# Patient Record
Sex: FEMALE | Race: WHITE | NOT HISPANIC OR LATINO | URBAN - NONMETROPOLITAN AREA
[De-identification: names, ages, dates, MRNs, and addresses within clinical notes are randomized per-mention and may not be internally consistent; named-entity substitution may affect disease eponyms.]

---

## 2023-05-22 ENCOUNTER — TELEPHONE (OUTPATIENT)
Dept: PRIMARY CARE | Facility: CLINIC | Age: 68
End: 2023-05-22
Payer: MEDICARE

## 2023-05-22 DIAGNOSIS — K21.9 GASTROESOPHAGEAL REFLUX DISEASE WITHOUT ESOPHAGITIS: Primary | ICD-10-CM

## 2023-05-22 RX ORDER — OMEPRAZOLE 20 MG/1
20 CAPSULE, DELAYED RELEASE ORAL DAILY
Qty: 90 CAPSULE | Refills: 1 | Status: SHIPPED | OUTPATIENT
Start: 2023-05-22 | End: 2024-02-09 | Stop reason: SDUPTHER

## 2023-05-22 RX ORDER — OMEPRAZOLE 20 MG/1
20 CAPSULE, DELAYED RELEASE ORAL DAILY
COMMUNITY
End: 2023-05-22 | Stop reason: SDUPTHER

## 2023-08-09 ENCOUNTER — OFFICE VISIT (OUTPATIENT)
Dept: PRIMARY CARE | Facility: CLINIC | Age: 68
End: 2023-08-09
Payer: MEDICARE

## 2023-08-09 VITALS
BODY MASS INDEX: 29.55 KG/M2 | DIASTOLIC BLOOD PRESSURE: 64 MMHG | WEIGHT: 166.8 LBS | HEIGHT: 63 IN | HEART RATE: 56 BPM | OXYGEN SATURATION: 98 % | SYSTOLIC BLOOD PRESSURE: 114 MMHG | TEMPERATURE: 97.2 F

## 2023-08-09 DIAGNOSIS — Z78.0 ASYMPTOMATIC POSTMENOPAUSAL STATE: ICD-10-CM

## 2023-08-09 DIAGNOSIS — Z00.00 MEDICARE ANNUAL WELLNESS VISIT, SUBSEQUENT: Primary | ICD-10-CM

## 2023-08-09 DIAGNOSIS — Z12.31 SCREENING MAMMOGRAM FOR BREAST CANCER: ICD-10-CM

## 2023-08-09 DIAGNOSIS — I48.0 PAROXYSMAL ATRIAL FIBRILLATION (MULTI): ICD-10-CM

## 2023-08-09 DIAGNOSIS — I47.10 PAROXYSMAL SVT (SUPRAVENTRICULAR TACHYCARDIA) (CMS-HCC): ICD-10-CM

## 2023-08-09 DIAGNOSIS — E78.5 HYPERLIPIDEMIA, UNSPECIFIED HYPERLIPIDEMIA TYPE: ICD-10-CM

## 2023-08-09 PROBLEM — M16.9 OSTEOARTHRITIS OF HIP: Status: RESOLVED | Noted: 2023-08-09 | Resolved: 2023-08-09

## 2023-08-09 PROBLEM — H01.006 BLEPHARITIS, BOTH EYES: Status: RESOLVED | Noted: 2023-08-09 | Resolved: 2023-08-09

## 2023-08-09 PROBLEM — J30.9 ALLERGIC RHINITIS: Status: ACTIVE | Noted: 2023-08-09

## 2023-08-09 PROBLEM — H17.9 BILATERAL CORNEAL SCARS: Status: RESOLVED | Noted: 2023-08-09 | Resolved: 2023-08-09

## 2023-08-09 PROBLEM — S05.8X9A: Status: RESOLVED | Noted: 2023-08-09 | Resolved: 2023-08-09

## 2023-08-09 PROBLEM — R42 VERTIGO: Status: RESOLVED | Noted: 2023-08-09 | Resolved: 2023-08-09

## 2023-08-09 PROBLEM — Z98.890 STATUS POST LASIK SURGERY: Status: RESOLVED | Noted: 2023-08-09 | Resolved: 2023-08-09

## 2023-08-09 PROBLEM — M77.01 MEDIAL EPICONDYLITIS OF RIGHT ELBOW: Status: RESOLVED | Noted: 2023-08-09 | Resolved: 2023-08-09

## 2023-08-09 PROBLEM — M54.16 LUMBAR RADICULOPATHY: Status: ACTIVE | Noted: 2023-08-09

## 2023-08-09 PROBLEM — R92.8 ABNORMAL SCREENING MAMMOGRAM: Status: RESOLVED | Noted: 2023-08-09 | Resolved: 2023-08-09

## 2023-08-09 PROBLEM — H25.12 AGE-RELATED NUCLEAR CATARACT OF LEFT EYE: Status: RESOLVED | Noted: 2023-08-09 | Resolved: 2023-08-09

## 2023-08-09 PROBLEM — H01.003 BLEPHARITIS, BOTH EYES: Status: RESOLVED | Noted: 2023-08-09 | Resolved: 2023-08-09

## 2023-08-09 PROBLEM — H25.13 NUCLEAR SCLEROTIC CATARACT OF BOTH EYES: Status: RESOLVED | Noted: 2023-08-09 | Resolved: 2023-08-09

## 2023-08-09 PROBLEM — K21.9 ESOPHAGEAL REFLUX: Status: ACTIVE | Noted: 2023-08-09

## 2023-08-09 PROBLEM — F41.0 PANIC ATTACKS: Status: ACTIVE | Noted: 2023-08-09

## 2023-08-09 PROBLEM — H31.001 CHORIORETINAL SCAR OF RIGHT EYE: Status: RESOLVED | Noted: 2023-08-09 | Resolved: 2023-08-09

## 2023-08-09 PROBLEM — K76.9 LIVER LESION: Status: RESOLVED | Noted: 2023-08-09 | Resolved: 2023-08-09

## 2023-08-09 PROBLEM — I10 HYPERTENSION: Status: ACTIVE | Noted: 2023-08-09

## 2023-08-09 PROBLEM — F41.9 ANXIETY: Status: ACTIVE | Noted: 2023-08-09

## 2023-08-09 PROBLEM — H02.59 FLOPPY LID SYNDROME: Status: RESOLVED | Noted: 2023-08-09 | Resolved: 2023-08-09

## 2023-08-09 PROBLEM — M72.2 PLANTAR FASCIITIS OF LEFT FOOT: Status: RESOLVED | Noted: 2023-08-09 | Resolved: 2023-08-09

## 2023-08-09 PROBLEM — H04.123 DRY EYES: Status: ACTIVE | Noted: 2023-08-09

## 2023-08-09 PROCEDURE — G0439 PPPS, SUBSEQ VISIT: HCPCS | Performed by: FAMILY MEDICINE

## 2023-08-09 PROCEDURE — 1159F MED LIST DOCD IN RCRD: CPT | Performed by: FAMILY MEDICINE

## 2023-08-09 PROCEDURE — 3078F DIAST BP <80 MM HG: CPT | Performed by: FAMILY MEDICINE

## 2023-08-09 PROCEDURE — 3008F BODY MASS INDEX DOCD: CPT | Performed by: FAMILY MEDICINE

## 2023-08-09 PROCEDURE — 1160F RVW MEDS BY RX/DR IN RCRD: CPT | Performed by: FAMILY MEDICINE

## 2023-08-09 PROCEDURE — 1170F FXNL STATUS ASSESSED: CPT | Performed by: FAMILY MEDICINE

## 2023-08-09 PROCEDURE — 3074F SYST BP LT 130 MM HG: CPT | Performed by: FAMILY MEDICINE

## 2023-08-09 PROCEDURE — 1036F TOBACCO NON-USER: CPT | Performed by: FAMILY MEDICINE

## 2023-08-09 RX ORDER — FLECAINIDE ACETATE 100 MG/1
1 TABLET ORAL 2 TIMES DAILY
COMMUNITY
Start: 2022-06-27 | End: 2023-08-09 | Stop reason: ALTCHOICE

## 2023-08-09 RX ORDER — CYCLOSPORINE 0.5 MG/ML
EMULSION OPHTHALMIC
COMMUNITY

## 2023-08-09 RX ORDER — MULTIVITAMIN
TABLET ORAL
COMMUNITY

## 2023-08-09 RX ORDER — RIVAROXABAN 20 MG/1
20 TABLET, FILM COATED ORAL NIGHTLY
COMMUNITY
End: 2024-04-10 | Stop reason: SDUPTHER

## 2023-08-09 RX ORDER — METOPROLOL TARTRATE 100 MG/1
100 TABLET ORAL 2 TIMES DAILY
COMMUNITY
End: 2023-12-27 | Stop reason: SDUPTHER

## 2023-08-09 ASSESSMENT — ENCOUNTER SYMPTOMS
DEPRESSION: 0
LOSS OF SENSATION IN FEET: 0
OCCASIONAL FEELINGS OF UNSTEADINESS: 0

## 2023-08-09 ASSESSMENT — ACTIVITIES OF DAILY LIVING (ADL)
TAKING_MEDICATION: INDEPENDENT
DOING_HOUSEWORK: INDEPENDENT
BATHING: INDEPENDENT
MANAGING_FINANCES: INDEPENDENT
DRESSING: INDEPENDENT
GROCERY_SHOPPING: INDEPENDENT

## 2023-08-09 ASSESSMENT — PATIENT HEALTH QUESTIONNAIRE - PHQ9
2. FEELING DOWN, DEPRESSED OR HOPELESS: NOT AT ALL
1. LITTLE INTEREST OR PLEASURE IN DOING THINGS: NOT AT ALL
SUM OF ALL RESPONSES TO PHQ9 QUESTIONS 1 AND 2: 0

## 2023-08-09 NOTE — PATIENT INSTRUCTIONS
Please follow up in 6 months  Please take your medications as prescribed  Please continue to follow up with cardiology and nutritionist  Please get your blood work, mammogram and dexa scan

## 2023-08-09 NOTE — PROGRESS NOTES
"Subjective   Reason for Visit: Suzan Marie is an 67 y.o. female here for a Medicare Wellness visit.     Past Medical, Surgical, and Family History reviewed and updated in chart.    Reviewed all medications by prescribing practitioner or clinical pharmacist (such as prescriptions, OTCs, herbal therapies and supplements) and documented in the medical record.    HPI  Here for medicare annual visit, has history of paroxysmal A-fib and SVT, never started the flecainide that was prescribed by her cardiologist, over due for follow up. Compliant with metoprolol. Denies any palpitations or chest pain.    Having a hard time losing weight, has been watching her diet and been active.     Patient Care Team:  Sridhar Kirby MD as PCP - General  Sridhar Kirby MD as PCP - [facility] ACO Attributed Provider     Review of Systems  General: no fever  Eyes: no blurry vision  ENT: no sore throat, no ear pain  Resp: no cough, sob or wheezing  Cardio: no chest pain, no palpitations  Abd: no nausea/vomiting  : no dysuria, no increased urinary frequency    Objective   Vitals:  /64   Pulse 56   Temp 36.2 °C (97.2 °F)   Ht 1.6 m (5' 3\")   Wt 75.7 kg (166 lb 12.8 oz)   SpO2 98%   BMI 29.55 kg/m²       Physical Exam  Gen: NAD, alert  Head: normocephalic/atraumatic  Eyes: conjunctivae normal  Ears: canals clear bilaterally, TM normal   Nose: external nose normal   Oropharynx: clear   Resp: Clear to auscultation  CVS: Regular rate and rhythm  Abdomen: soft, NT, ND  Ext: no edema, NT of lower extremities  Neuro: gait normal     Assessment/Plan   Problem List Items Addressed This Visit       Hyperlipidemia    Relevant Orders    Lipid Panel    Paroxysmal atrial fibrillation (CMS/HCC)    Relevant Medications    metoprolol tartrate (Lopressor) 100 mg tablet    Other Relevant Orders    Lipid Panel    Comprehensive Metabolic Panel    CBC    TSH with reflex to Free T4 if abnormal    Paroxysmal SVT (supraventricular " tachycardia) (CMS/HCC)    Relevant Medications    metoprolol tartrate (Lopressor) 100 mg tablet     Other Visit Diagnoses       Medicare annual wellness visit, subsequent    -  Primary    BMI 29.0-29.9,adult        Relevant Orders    Referral to Nutrition Services    Screening mammogram for breast cancer        Relevant Orders    BI mammo bilateral screening tomosynthesis    Asymptomatic postmenopausal state        Relevant Orders    XR DEXA bone density

## 2023-08-10 ENCOUNTER — LAB (OUTPATIENT)
Dept: LAB | Facility: LAB | Age: 68
End: 2023-08-10
Payer: MEDICARE

## 2023-08-10 DIAGNOSIS — E78.5 HYPERLIPIDEMIA, UNSPECIFIED HYPERLIPIDEMIA TYPE: ICD-10-CM

## 2023-08-10 DIAGNOSIS — I48.0 PAROXYSMAL ATRIAL FIBRILLATION (MULTI): ICD-10-CM

## 2023-08-10 LAB
ALANINE AMINOTRANSFERASE (SGPT) (U/L) IN SER/PLAS: 12 U/L (ref 7–45)
ALBUMIN (G/DL) IN SER/PLAS: 4.3 G/DL (ref 3.4–5)
ALKALINE PHOSPHATASE (U/L) IN SER/PLAS: 72 U/L (ref 33–136)
ANION GAP IN SER/PLAS: 13 MMOL/L (ref 10–20)
ASPARTATE AMINOTRANSFERASE (SGOT) (U/L) IN SER/PLAS: 15 U/L (ref 9–39)
BILIRUBIN TOTAL (MG/DL) IN SER/PLAS: 0.4 MG/DL (ref 0–1.2)
CALCIUM (MG/DL) IN SER/PLAS: 9.3 MG/DL (ref 8.6–10.3)
CARBON DIOXIDE, TOTAL (MMOL/L) IN SER/PLAS: 28 MMOL/L (ref 21–32)
CHLORIDE (MMOL/L) IN SER/PLAS: 103 MMOL/L (ref 98–107)
CHOLESTEROL (MG/DL) IN SER/PLAS: 228 MG/DL (ref 0–199)
CHOLESTEROL IN HDL (MG/DL) IN SER/PLAS: 55.9 MG/DL
CHOLESTEROL/HDL RATIO: 4.1
CREATININE (MG/DL) IN SER/PLAS: 0.7 MG/DL (ref 0.5–1.05)
ERYTHROCYTE DISTRIBUTION WIDTH (RATIO) BY AUTOMATED COUNT: 12.6 % (ref 11.5–14.5)
ERYTHROCYTE MEAN CORPUSCULAR HEMOGLOBIN CONCENTRATION (G/DL) BY AUTOMATED: 32.6 G/DL (ref 32–36)
ERYTHROCYTE MEAN CORPUSCULAR VOLUME (FL) BY AUTOMATED COUNT: 96 FL (ref 80–100)
ERYTHROCYTES (10*6/UL) IN BLOOD BY AUTOMATED COUNT: 4.26 X10E12/L (ref 4–5.2)
GFR FEMALE: >90 ML/MIN/1.73M2
GLUCOSE (MG/DL) IN SER/PLAS: 94 MG/DL (ref 74–99)
HEMATOCRIT (%) IN BLOOD BY AUTOMATED COUNT: 41.1 % (ref 36–46)
HEMOGLOBIN (G/DL) IN BLOOD: 13.4 G/DL (ref 12–16)
LDL: 148 MG/DL (ref 0–99)
LEUKOCYTES (10*3/UL) IN BLOOD BY AUTOMATED COUNT: 3.9 X10E9/L (ref 4.4–11.3)
PLATELETS (10*3/UL) IN BLOOD AUTOMATED COUNT: 227 X10E9/L (ref 150–450)
POTASSIUM (MMOL/L) IN SER/PLAS: 4.3 MMOL/L (ref 3.5–5.3)
PROTEIN TOTAL: 6.7 G/DL (ref 6.4–8.2)
SODIUM (MMOL/L) IN SER/PLAS: 140 MMOL/L (ref 136–145)
THYROTROPIN (MIU/L) IN SER/PLAS BY DETECTION LIMIT <= 0.05 MIU/L: 2.21 MIU/L (ref 0.44–3.98)
TRIGLYCERIDE (MG/DL) IN SER/PLAS: 119 MG/DL (ref 0–149)
UREA NITROGEN (MG/DL) IN SER/PLAS: 20 MG/DL (ref 6–23)
VLDL: 24 MG/DL (ref 0–40)

## 2023-08-10 PROCEDURE — 85027 COMPLETE CBC AUTOMATED: CPT

## 2023-08-10 PROCEDURE — 80061 LIPID PANEL: CPT

## 2023-08-10 PROCEDURE — 80053 COMPREHEN METABOLIC PANEL: CPT

## 2023-08-10 PROCEDURE — 84443 ASSAY THYROID STIM HORMONE: CPT

## 2023-08-10 PROCEDURE — 36415 COLL VENOUS BLD VENIPUNCTURE: CPT

## 2023-12-01 ENCOUNTER — APPOINTMENT (OUTPATIENT)
Dept: RADIOLOGY | Facility: HOSPITAL | Age: 68
End: 2023-12-01
Payer: MEDICARE

## 2023-12-01 ENCOUNTER — HOSPITAL ENCOUNTER (EMERGENCY)
Facility: HOSPITAL | Age: 68
Discharge: HOME | End: 2023-12-01
Attending: EMERGENCY MEDICINE
Payer: MEDICARE

## 2023-12-01 VITALS
OXYGEN SATURATION: 94 % | WEIGHT: 158 LBS | BODY MASS INDEX: 28 KG/M2 | RESPIRATION RATE: 19 BRPM | TEMPERATURE: 97.7 F | SYSTOLIC BLOOD PRESSURE: 141 MMHG | HEART RATE: 51 BPM | DIASTOLIC BLOOD PRESSURE: 89 MMHG | HEIGHT: 63 IN

## 2023-12-01 DIAGNOSIS — R51.9 ACUTE NONINTRACTABLE HEADACHE, UNSPECIFIED HEADACHE TYPE: ICD-10-CM
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
## 2023-12-01 PROCEDURE — 70450 CT HEAD/BRAIN W/O DYE: CPT

## 2023-12-01 PROCEDURE — 72125 CT NECK SPINE W/O DYE: CPT

## 2023-12-01 PROCEDURE — 99285 EMERGENCY DEPT VISIT HI MDM: CPT | Mod: 25 | Performed by: EMERGENCY MEDICINE

## 2023-12-01 PROCEDURE — 72125 CT NECK SPINE W/O DYE: CPT | Performed by: RADIOLOGY

## 2023-12-01 PROCEDURE — 70450 CT HEAD/BRAIN W/O DYE: CPT | Performed by: RADIOLOGY

## 2023-12-01 RX ORDER — ACETAMINOPHEN 325 MG/1
650 TABLET ORAL ONCE
Status: COMPLETED | OUTPATIENT
Start: 2023-12-01 | End: 2023-12-01

## 2023-12-01 RX ADMIN — ACETAMINOPHEN 650 MG: 325 TABLET ORAL at 12:27

## 2023-12-01 ASSESSMENT — PAIN SCALES - GENERAL
PAINLEVEL_OUTOF10: 4
PAINLEVEL_OUTOF10: 4
PAINLEVEL_OUTOF10: 0 - NO PAIN

## 2023-12-01 ASSESSMENT — PAIN - FUNCTIONAL ASSESSMENT
PAIN_FUNCTIONAL_ASSESSMENT: 0-10

## 2023-12-01 ASSESSMENT — COLUMBIA-SUICIDE SEVERITY RATING SCALE - C-SSRS
1. IN THE PAST MONTH, HAVE YOU WISHED YOU WERE DEAD OR WISHED YOU COULD GO TO SLEEP AND NOT WAKE UP?: NO
6. HAVE YOU EVER DONE ANYTHING, STARTED TO DO ANYTHING, OR PREPARED TO DO ANYTHING TO END YOUR LIFE?: NO
2. HAVE YOU ACTUALLY HAD ANY THOUGHTS OF KILLING YOURSELF?: NO

## 2023-12-01 ASSESSMENT — PAIN DESCRIPTION - LOCATION: LOCATION: HEAD

## 2023-12-01 NOTE — ED PROVIDER NOTES
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    Corneal injury due to contact lens 08/09/2023    Dry eye syndrome of left lacrimal gland 01/27/2015    Dry eye syndrome of left lacrimal gland    Dry eye syndrome of right lacrimal gland 01/22/2015    Dry eye syndrome of right lacrimal gland    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    Fracture of unspecified metatarsal bone(s), right foot, initial encounter for closed fracture 06/29/2016    Avulsion fracture of metatarsal bone of right foot    Hypertension     Medial epicondylitis of right elbow 08/09/2023    Nuclear sclerotic cataract of both eyes 08/09/2023    Osteoarthritis of hip 08/09/2023    Plantar fasciitis of left foot 08/09/2023    Sjogren syndrome, unspecified (CMS/HCC) 02/11/2020    Sicca    Status post LASIK surgery 08/09/2023    Vertigo 08/09/2023     Past Surgical History:   Procedure Laterality Date    CT ANGIO NECK W AND WO IV CONTRAST  6/7/2022    CT NECK ANGIO W AND WO IV CONTRAST 6/7/2022 [facility]    HEMORRHOID SURGERY  09/14/2012    Hemorrhoidectomy    HYSTERECTOMY  09/14/2012    Hysterectomy    REFRACTIVE SURGERY  01/22/2015    Corneal LASIK    SHOULDER SURGERY  07/13/2017    Shoulder Surgery     Family History   Problem Relation Name Age of Onset    Atrial fibrillation Mother      Cataracts Mother      Glaucoma Mother      Uterine cancer Mother      Diabetes Father      Cataracts Father      Coronary artery disease Father      Glaucoma Father      Hypertension  Father      Lymphoma Brother      Congenital heart disease Son      Diabetes Paternal Grandmother      Sjogren's syndrome Mother's Sister      Sudden death Other maternal uncle     Glaucoma Other paternal aunt     Sjogren's syndrome Cousin maternal      Social History     Tobacco Use    Smoking status: Former     Packs/day: 2.50     Years: 35.00     Additional pack years: 0.00     Total pack years: 87.50     Types: Cigarettes    Smokeless tobacco: Never   Substance Use Topics    Alcohol use: Yes     Comment: occasional    Drug use: Never       Physical Exam   ED Triage Vitals [12/01/23 1145]   Temp Heart Rate Resp BP   36.5 °C (97.7 °F) 54 18 (!) 149/97      SpO2 Temp src Heart Rate Source Patient Position   98 % -- -- --      BP Location FiO2 (%)     -- --       Physical Exam  Constitutional:       General: She is not in acute distress.     Appearance: Normal appearance. She is not toxic-appearing.   HENT:      Head: Normocephalic and atraumatic.      Right Ear: Tympanic membrane normal.      Left Ear: Tympanic membrane normal.      Mouth/Throat:      Mouth: Mucous membranes are moist.      Pharynx: Oropharynx is clear.   Eyes:      Conjunctiva/sclera: Conjunctivae normal.      Pupils: Pupils are equal, round, and reactive to light.   Cardiovascular:      Rate and Rhythm: Normal rate and regular rhythm.      Pulses: Normal pulses.      Heart sounds: Normal heart sounds.   Pulmonary:      Effort: Pulmonary effort is normal. No respiratory distress.      Breath sounds: Normal breath sounds. No wheezing.   Abdominal:      General: Bowel sounds are normal.      Palpations: Abdomen is soft.      Tenderness: There is no abdominal tenderness. There is no guarding or rebound.   Musculoskeletal:         General: Normal range of motion.      Cervical back: Normal range of motion.   Skin:     General: Skin is warm and dry.   Neurological:      General: No focal deficit present.      Mental Status: She is alert and oriented to  person, place, and time.         ED Course & MDM   Diagnoses as of 12/01/23 4728   Fall, initial encounter   Acute nonintractable headache, unspecified headache type       Medical Decision Making  76-year-old female presents to the ER with chief complaint of a headache unfortunately patient reports that she is on blood thinners and she also fell 2 days ago hitting her head.  Patient is concerned with the headache and the recent fall patient came to the ED for evaluation.  Patient reports that headache came on gradually this morning was not sudden onset.  Patient reports she has headaches in the past but nothing significant.  This is more of a typical headache but in the setting of her hitting her head she got concerned.  Patient had a evaluation including head CT and neck CT both are negative.  Patient is given some Tylenol here in the ED patient reports her headache has improved.  Patient be discharged home to follow-up with her PCP as needed.        Procedure  Procedures     Misael Cordero, DO  12/01/23 1251

## 2023-12-27 DIAGNOSIS — I48.0 PAROXYSMAL ATRIAL FIBRILLATION (MULTI): Primary | ICD-10-CM

## 2023-12-28 RX ORDER — METOPROLOL TARTRATE 100 MG/1
100 TABLET ORAL 2 TIMES DAILY
Qty: 180 TABLET | Refills: 0 | Status: SHIPPED | OUTPATIENT
Start: 2023-12-28 | End: 2024-03-27

## 2024-01-16 ENCOUNTER — OFFICE VISIT (OUTPATIENT)
Dept: CARDIOLOGY | Facility: CLINIC | Age: 69
End: 2024-01-16
Payer: MEDICARE

## 2024-01-16 VITALS
WEIGHT: 163.4 LBS | OXYGEN SATURATION: 97 % | BODY MASS INDEX: 28.95 KG/M2 | HEART RATE: 61 BPM | SYSTOLIC BLOOD PRESSURE: 148 MMHG | DIASTOLIC BLOOD PRESSURE: 88 MMHG

## 2024-01-16 DIAGNOSIS — I10 PRIMARY HYPERTENSION: Primary | ICD-10-CM

## 2024-01-16 DIAGNOSIS — E78.2 MIXED HYPERLIPIDEMIA: ICD-10-CM

## 2024-01-16 DIAGNOSIS — I47.10 PAROXYSMAL SVT (SUPRAVENTRICULAR TACHYCARDIA) (CMS-HCC): ICD-10-CM

## 2024-01-16 DIAGNOSIS — I48.0 PAROXYSMAL ATRIAL FIBRILLATION (MULTI): ICD-10-CM

## 2024-01-16 PROCEDURE — 1160F RVW MEDS BY RX/DR IN RCRD: CPT | Performed by: NURSE PRACTITIONER

## 2024-01-16 PROCEDURE — 1159F MED LIST DOCD IN RCRD: CPT | Performed by: NURSE PRACTITIONER

## 2024-01-16 PROCEDURE — 1126F AMNT PAIN NOTED NONE PRSNT: CPT | Performed by: NURSE PRACTITIONER

## 2024-01-16 PROCEDURE — 1036F TOBACCO NON-USER: CPT | Performed by: NURSE PRACTITIONER

## 2024-01-16 PROCEDURE — 99214 OFFICE O/P EST MOD 30 MIN: CPT | Performed by: NURSE PRACTITIONER

## 2024-01-16 PROCEDURE — 3079F DIAST BP 80-89 MM HG: CPT | Performed by: NURSE PRACTITIONER

## 2024-01-16 PROCEDURE — 3077F SYST BP >= 140 MM HG: CPT | Performed by: NURSE PRACTITIONER

## 2024-01-16 PROCEDURE — 3008F BODY MASS INDEX DOCD: CPT | Performed by: NURSE PRACTITIONER

## 2024-01-16 RX ORDER — METOPROLOL SUCCINATE 100 MG/1
200 TABLET, EXTENDED RELEASE ORAL DAILY
COMMUNITY
End: 2024-01-16 | Stop reason: ALTCHOICE

## 2024-01-16 RX ORDER — FLECAINIDE ACETATE 100 MG/1
TABLET ORAL
COMMUNITY
End: 2024-01-16 | Stop reason: ALTCHOICE

## 2024-01-16 NOTE — LETTER
January 16, 2024     Sridhar Kirby MD  [address] [facility] [address]    Patient: Suzan Marie   YOB: 1955   Date of Visit: 1/16/2024       Dear Dr. Sridhar Kirby MD:    Thank you for referring Suzan Marie to me for evaluation. Below are my notes for this consultation.  If you have questions, please do not hesitate to call me. I look forward to following your patient along with you.       Sincerely,     Suyapa Herrera, APRN-CNP      CC: No Recipients  ______________________________________________________________________________________    Primary Care Physician: Sridhar Kirby MD  Primary Cardiologist:       Date of Visit: 01/16/2024 10:40 AM EST  [facility] of visit:  [facility]   Type of Visit: Follow up             Chief Complaint   Patient presents with   • Follow-up     No concerns       HPI / Summary:   Suzan Marie is a 68 y.o. female   known to Dr. Desai with Normal coronaries (Cath 2010), negative stress testing (2016), mild ASCVD on CT cardiac scoring (*231), DLP (declines statin), Preserved LV systolic function 65-70%, and PAF (1% burden on extended Holter ~ June 2022) who returns for routine follow up       Feeling well without bothersome palpitations, chest discomfort or unusual dyspnea.     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   • Corneal injury due to contact lens 08/09/2023   • Dry eye  syndrome of left lacrimal gland 01/27/2015    Dry eye syndrome of left lacrimal gland   • Dry eye syndrome of right lacrimal gland 01/22/2015    Dry eye syndrome of right lacrimal gland   • 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   • Fracture of unspecified metatarsal bone(s), right foot, initial encounter for closed fracture 06/29/2016    Avulsion fracture of metatarsal bone of right foot   • Hypertension    • Medial epicondylitis of right elbow 08/09/2023   • Nuclear sclerotic cataract of both eyes 08/09/2023   • Osteoarthritis of hip 08/09/2023   • Plantar fasciitis of left foot 08/09/2023   • Sjogren syndrome, unspecified (CMS/HCC) 02/11/2020    Sicca   • Status post LASIK surgery 08/09/2023   • Vertigo 08/09/2023       Social History:   Tobacco Use: Medium Risk (1/16/2024)    Patient History    • Smoking Tobacco Use: Former    • Smokeless Tobacco Use: Never    • Passive Exposure: Not on file         MEDICATIONS:   Current Outpatient Medications   Medication Instructions   • cycloSPORINE (Restasis) 0.05 % ophthalmic emulsion INSTILL 1 DROP INTO EACH EYE TWICE DAILY   • metoprolol tartrate (LOPRESSOR) 100 mg, oral, 2 times daily   • multivitamin tablet oral   • omeprazole (PRILOSEC) 20 mg, oral, Daily   • RED YEAST RICE ORAL oral   • Xarelto 20 mg, oral, Nightly         IMAGING REVIEWED:   Echocardiogram:   Echocardiogram     [facility], [address] [place] [address]  Tel [phone] and Fax [phone]    TRANSTHORACIC ECHOCARDIOGRAM REPORT      Patient Name:     MAKENZIE WAITE   Reading Physician:  [id number] Rajani Desai MD  Study Date:       4/15/2022          Referring           PEPE MOE  Physician:  MRN/PID:          [id number]           PCP:                [id number] Sridhar Kirby MD  Accession/Order#: [id number]          Department          [facility]  Location:  YOB: 1955           Fellow:  Gender:           F                   Nurse:  Admit Date:       4/14/2022          Sonographer:        Sharon Beaulieu [facility]  Admission Status: Inpatient -        Additional Staff:  Routine  Height:           160.02 cm          CC Report to:        [place]  Weight:           76.20 kg           Study Type:         Echocardiogram  BSA:              1.80 m2  Blood Pressure: 135 /71 mmHg    Diagnosis/ICD: R94.31-Abnormal electrocardiogram [ECG] [EKG]  Indication:    AFib  Procedure/CPT: Echo Complete w Full [id number]    Patient History:  Pertinent History: A-Fib and HTN.    Study Detail: The following Echo studies were performed: 2D, M-Mode, Doppler and  color flow. Technically challenging study due to Respiratory  Interference. The patient was awake.      PHYSICIAN INTERPRETATION:  Left Ventricle: The left ventricular systolic function is normal, with an estimated ejection fraction of 65-70%. There are no regional wall motion abnormalities. The left ventricular cavity size is normal. Spectral Doppler shows a normal pattern of left ventricular diastolic filling.  Left Atrium: The left atrium is normal in size.  Right Ventricle: The right ventricle is normal in size. There is normal right ventricular global systolic function.  Right Atrium: The right atrium is normal in size.  Aortic Valve: The aortic valve is trileaflet. There is no evidence of aortic valve regurgitation. The peak instantaneous gradient of the aortic valve is 7.5 mmHg.  Mitral Valve: The mitral valve is normal in structure. There is mild mitral valve regurgitation.  Tricuspid Valve: The tricuspid valve is structurally normal. There is mild tricuspid regurgitation.  Pulmonic Valve: The pulmonic valve is not well visualized. There is physiologic pulmonic valve regurgitation.  Pericardium: There is no pericardial effusion noted.  Aorta: The aortic root is normal.      CONCLUSIONS:  1. The left ventricular systolic function is normal with a 65-70% estimated ejection fraction.       [id number]  Rajani Desai MD  Electronically signed on 4/15/2022 at 2:43:29 PM         Cardiac Scoring:   CT heart calcium scoring wo IV contrast 08/11/2022    Narrative  MRN: [id number]  Patient Name: MAKENZIE WAITE    STUDY:  CT CARDIAC SCORING;  8/11/2022 2:41 pm    INDICATION:  Unspecified atrial fibrillation.    COMPARISON:  05/21/2020 CT    ACCESSION NUMBER(S):  [id number]    ORDERING CLINICIAN:  SUPRIYA REYNOSO    TECHNIQUE:  Using prospective ECG gating, CT scan of the coronary arteries was  performed without intravenous contrast. Coronary calcium scoring  was  performed according to the method of Agatston.    FINDINGS:  The score and distribution of calcium in the coronary arteries is as  follows:    LM 0  .36  LCx 0  RCA 0    Total 231.36    The visualized mid/lower ascending thoracic aorta measures 3.7 cm in  diameter. The heart is normal in size. No pericardial effusion is  present.    No gross evidence of mediastinal or hilar lymphadenopathy or masses  is identified. The visualized segments of the lungs are normally  expanded.    The visualized subdiaphragmatic structures appear intact.    Impression  Coronary artery calcium score of 231.36*. (Previously 119.06 on the  05/21/2020 CT)           LABS:  CBC with Differential:    Lab Results   Component Value Date    WBC 3.9 (L) 08/10/2023    RBC 4.26 08/10/2023    HGB 13.4 08/10/2023    HCT 41.1 08/10/2023     08/10/2023    MCV 96 08/10/2023    MCHC 32.6 08/10/2023    RDW 12.6 08/10/2023    LYMPHOPCT 32.7 06/07/2022    MONOPCT 9.1 06/07/2022    EOSPCT 1.8 06/07/2022    BASOPCT 0.5 06/07/2022    MONOSABS 0.36 06/07/2022    LYMPHSABS 1.30 06/07/2022    EOSABS 0.07 06/07/2022    BASOSABS 0.02 06/07/2022     CMP:    Lab Results   Component Value Date     08/10/2023    K 4.3 08/10/2023     08/10/2023    CO2 28 08/10/2023    BUN 20 08/10/2023    CREATININE 0.70 08/10/2023    GLUCOSE 94 08/10/2023    PROT 6.7 08/10/2023    CALCIUM 9.3  08/10/2023    BILITOT 0.4 08/10/2023    ALKPHOS 72 08/10/2023    AST 15 08/10/2023    ALT 12 08/10/2023     BMP:    Lab Results   Component Value Date     08/10/2023    K 4.3 08/10/2023     08/10/2023    CO2 28 08/10/2023    BUN 20 08/10/2023    CREATININE 0.70 08/10/2023    CALCIUM 9.3 08/10/2023    GLUCOSE 94 08/10/2023     Magnesium:  Lab Results   Component Value Date    MG 1.93 04/14/2022     Troponin:    Lab Results   Component Value Date    TROPHS <3 06/07/2022    TROPHS <3 06/07/2022     BNP:   Lab Results   Component Value Date     (H) 06/07/2022         Lipid Panel:  Lab Results   Component Value Date    HDL 55.9 08/10/2023    CHHDL 4.1 08/10/2023    VLDL 24 08/10/2023    TRIG 119 08/10/2023        Lab work and imaging results independently reviewed by me     Visit Vitals  /88   Pulse 61   Wt 74.1 kg (163 lb 6.4 oz)   SpO2 97%   BMI 28.95 kg/m²   Smoking Status Former   BSA 1.81 m²            Constitutional:       Appearance: Healthy appearance. Not in distress.   Eyes:      Conjunctiva/sclera: Conjunctivae normal.   Neck:      Vascular: JVD normal.   Pulmonary:      Effort: Pulmonary effort is normal.      Breath sounds: Normal breath sounds.   Cardiovascular:      PMI at left midclavicular line. Normal rate. Regular rhythm. Normal S1. Normal S2.       Murmurs: There is no murmur.      No rub.   Pulses:     Intact distal pulses.   Edema:     Peripheral edema absent.   Abdominal:      General: Bowel sounds are normal.   Musculoskeletal:      Cervical back: Neck supple. Skin:     General: Skin is warm and dry.   Neurological:      Mental Status: Alert and oriented to person, place and time.       [id number] Score    Age 65-74: 1   Sex Female: 1   CHF History No: 0   HTN Yes: 1   Stroke/TIA/Thromboembolism No: 0   Vascular Dz: CAD/PAD/Aortic Plaque Yes: 1   DM No: 0   Total Score 4         Problem List Items Addressed This Visit             ICD-10-CM    Hyperlipidemia E78.5     Hypertension - Primary I10    Paroxysmal atrial fibrillation (CMS/HCC) I48.0    RESOLVED: Paroxysmal SVT (supraventricular tachycardia) I47.10         BP is in acceptable range on current RX which She is tolerating well and agrees to continue   No bothersome episodes of PAF    OAC with Eliquis which is affordable    Metoprolol 100 mg BID     Most recent  mg/dL with ASCVD on CT chest    She is not keen on starting a statin    Red yeast rice    Mediterranean diet    Suggest statin vs PCSK9 inhibitor if no improvement in 6 months                01/16/24 at 2:23 PM - LOUIS Boyle-CNP      Orders:  No orders of the defined types were placed in this encounter.        Followup Appts:  Future Appointments   Date Time Provider Department Center   2/9/2024  9:00 AM Sridhar Kirby MD [id number]

## 2024-01-16 NOTE — PROGRESS NOTES
Primary Care Physician: Sridhar Kirby MD  Primary Cardiologist:       Date of Visit: 01/16/2024 10:40 AM EST  Location of visit:  [facility]   Type of Visit: Follow up             Chief Complaint   Patient presents with    Follow-up     No concerns       HPI / Summary:   Suzan Marie is a 68 y.o. female   known to Dr. Desai with Normal coronaries (Cath 2010), negative stress testing (2016), mild ASCVD on CT cardiac scoring (*231), DLP (declines statin), Preserved LV systolic function 65-70%, and PAF (1% burden on extended Holter ~ June 2022) who returns for routine follow up       Feeling well without bothersome palpitations, chest discomfort or unusual dyspnea.     Home BP readings mainly 130's systolic, sometimes as high as 150 mmHg   12 system review is negative except as noted above    FH:  dad hx MI   M Gma AF   M Sister AF     Medical History:   Past Medical History:   Diagnosis Date    Abnormal screening mammogram 08/09/2023    Age-related nuclear cataract, left eye 01/22/2015    Age-related nuclear cataract of left eye    Age-related nuclear cataract, right eye 01/22/2015    Age-related nuclear cataract of right eye    Bilateral corneal scars 08/09/2023    Blepharitis, both eyes 08/09/2023    Chorioretinal scar of right eye 08/09/2023    Corneal injury due to contact lens 08/09/2023    Dry eye syndrome of left lacrimal gland 01/27/2015    Dry eye syndrome of left lacrimal gland    Dry eye syndrome of right lacrimal gland 01/22/2015    Dry eye syndrome of right lacrimal gland    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    Fracture of unspecified metatarsal bone(s), right foot, initial encounter for closed fracture 06/29/2016    Avulsion fracture of metatarsal bone of right foot    Hypertension     Medial epicondylitis of right elbow 08/09/2023    Nuclear sclerotic cataract of both eyes 08/09/2023    Osteoarthritis of hip 08/09/2023    Plantar fasciitis of left foot 08/09/2023    Sjogren syndrome,  unspecified (CMS/[facility]) 02/11/2020    Sicca    Status post LASIK surgery 08/09/2023    Vertigo 08/09/2023       Social History:   Tobacco Use: Medium Risk (1/16/2024)    Patient History     Smoking Tobacco Use: Former     Smokeless Tobacco Use: Never     Passive Exposure: Not on file         MEDICATIONS:   Current Outpatient Medications   Medication Instructions    cycloSPORINE (Restasis) 0.05 % ophthalmic emulsion INSTILL 1 DROP INTO EACH EYE TWICE DAILY    metoprolol tartrate (LOPRESSOR) 100 mg, oral, 2 times daily    multivitamin tablet oral    omeprazole (PRILOSEC) 20 mg, oral, Daily    RED YEAST RICE ORAL oral    Xarelto 20 mg, oral, Nightly         IMAGING REVIEWED:   Echocardiogram:   Echocardiogram     Narrative  [facility], [address] [place] [address]  Tel [phone] and Fax [phone]    TRANSTHORACIC ECHOCARDIOGRAM REPORT      Patient Name:     MAKENZIE WAITE   Reading Physician:  [id number] Rajani Desai MD  Study Date:       4/15/2022          Referring           PEPE MOE  Physician:  MRN/PID:          [id number]           PCP:                [id number] Sridhar Kirby MD  Accession/Order#: [id number]          Department          [facility]  Location:  YOB: 1955           Fellow:  Gender:           F                  Nurse:  Admit Date:       4/14/2022          Sonographer:        Sharon Beaulieu RDCS  Admission Status: Inpatient -        Additional Staff:  Routine  Height:           160.02 cm          CC Report to:       [facility]  Weight:           76.20 kg           Study Type:         Echocardiogram  BSA:              1.80 m2  Blood Pressure: 135 /71 mmHg    Diagnosis/ICD: R94.31-Abnormal electrocardiogram [ECG] [EKG]  Indication:    AFib  Procedure/CPT: Echo Complete w Full Doppler-[id number]    Patient History:  Pertinent History: A-Fib and HTN.    Study Detail: The following Echo studies were performed: 2D, M-Mode, Doppler and  color flow.  Technically challenging study due to Respiratory  Interference. The patient was awake.      PHYSICIAN INTERPRETATION:  Left Ventricle: The left ventricular systolic function is normal, with an estimated ejection fraction of 65-70%. There are no regional wall motion abnormalities. The left ventricular cavity size is normal. Spectral Doppler shows a normal pattern of left ventricular diastolic filling.  Left Atrium: The left atrium is normal in size.  Right Ventricle: The right ventricle is normal in size. There is normal right ventricular global systolic function.  Right Atrium: The right atrium is normal in size.  Aortic Valve: The aortic valve is trileaflet. There is no evidence of aortic valve regurgitation. The peak instantaneous gradient of the aortic valve is 7.5 mmHg.  Mitral Valve: The mitral valve is normal in structure. There is mild mitral valve regurgitation.  Tricuspid Valve: The tricuspid valve is structurally normal. There is mild tricuspid regurgitation.  Pulmonic Valve: The pulmonic valve is not well visualized. There is physiologic pulmonic valve regurgitation.  Pericardium: There is no pericardial effusion noted.  Aorta: The aortic root is normal.      CONCLUSIONS:  1. The left ventricular systolic function is normal with a 65-70% estimated ejection fraction.       [id number] Rajani Desai MD  Electronically signed on 4/15/2022 at 2:43:29 PM         Cardiac Scoring:   CT heart calcium scoring wo IV contrast 08/11/2022    Narrative  MRN: [id number]  Patient Name: MAKENZIE WAITE    STUDY:  CT CARDIAC SCORING;  8/11/2022 2:41 pm    INDICATION:  Unspecified atrial fibrillation.    COMPARISON:  05/21/2020 CT    ACCESSION NUMBER(S):  [id number]    ORDERING CLINICIAN:  SUPRIYA REYNOSO    TECHNIQUE:  Using prospective ECG gating, CT scan of the coronary arteries was  performed without intravenous contrast. Coronary calcium scoring  was  performed according to the method of Agatston.    FINDINGS:  The  score and distribution of calcium in the coronary arteries is as  follows:    LM 0  .36  LCx 0  RCA 0    Total 231.36    The visualized mid/lower ascending thoracic aorta measures 3.7 cm in  diameter. The heart is normal in size. No pericardial effusion is  present.    No gross evidence of mediastinal or hilar lymphadenopathy or masses  is identified. The visualized segments of the lungs are normally  expanded.    The visualized subdiaphragmatic structures appear intact.    Impression  Coronary artery calcium score of 231.36*. (Previously 119.06 on the  05/21/2020 CT)           LABS:  CBC with Differential:    Lab Results   Component Value Date    WBC 3.9 (L) 08/10/2023    RBC 4.26 08/10/2023    HGB 13.4 08/10/2023    HCT 41.1 08/10/2023     08/10/2023    MCV 96 08/10/2023    MCHC 32.6 08/10/2023    RDW 12.6 08/10/2023    LYMPHOPCT 32.7 06/07/2022    MONOPCT 9.1 06/07/2022    EOSPCT 1.8 06/07/2022    BASOPCT 0.5 06/07/2022    MONOSABS 0.36 06/07/2022    LYMPHSABS 1.30 06/07/2022    EOSABS 0.07 06/07/2022    BASOSABS 0.02 06/07/2022     CMP:    Lab Results   Component Value Date     08/10/2023    K 4.3 08/10/2023     08/10/2023    CO2 28 08/10/2023    BUN 20 08/10/2023    CREATININE 0.70 08/10/2023    GLUCOSE 94 08/10/2023    PROT 6.7 08/10/2023    CALCIUM 9.3 08/10/2023    BILITOT 0.4 08/10/2023    ALKPHOS 72 08/10/2023    AST 15 08/10/2023    ALT 12 08/10/2023     BMP:    Lab Results   Component Value Date     08/10/2023    K 4.3 08/10/2023     08/10/2023    CO2 28 08/10/2023    BUN 20 08/10/2023    CREATININE 0.70 08/10/2023    CALCIUM 9.3 08/10/2023    GLUCOSE 94 08/10/2023     Magnesium:  Lab Results   Component Value Date    MG 1.93 04/14/2022     Troponin:    Lab Results   Component Value Date    TROPHS <3 06/07/2022    TROPHS <3 06/07/2022     BNP:   Lab Results   Component Value Date     (H) 06/07/2022         Lipid Panel:  Lab Results   Component Value Date     HDL 55.9 08/10/2023    CHHDL 4.1 08/10/2023    VLDL 24 08/10/2023    TRIG 119 08/10/2023        Lab work and imaging results independently reviewed by me     Visit Vitals  /88   Pulse 61   Wt 74.1 kg (163 lb 6.4 oz)   SpO2 97%   BMI 28.95 kg/m²   Smoking Status Former   BSA 1.81 m²            Constitutional:       Appearance: Healthy appearance. Not in distress.   Eyes:      Conjunctiva/sclera: Conjunctivae normal.   Neck:      Vascular: JVD normal.   Pulmonary:      Effort: Pulmonary effort is normal.      Breath sounds: Normal breath sounds.   Cardiovascular:      PMI at left midclavicular line. Normal rate. Regular rhythm. Normal S1. Normal S2.       Murmurs: There is no murmur.      No rub.   Pulses:     Intact distal pulses.   Edema:     Peripheral edema absent.   Abdominal:      General: Bowel sounds are normal.   Musculoskeletal:      Cervical back: Neck supple. Skin:     General: Skin is warm and dry.   Neurological:      Mental Status: Alert and oriented to person, place and time.       [id number] Score    Age 65-74: 1   Sex Female: 1   CHF History No: 0   HTN Yes: 1   Stroke/TIA/Thromboembolism No: 0   Vascular Dz: CAD/PAD/Aortic Plaque Yes: 1   DM No: 0   Total Score 4         Problem List Items Addressed This Visit             ICD-10-CM    Hyperlipidemia E78.5    Hypertension - Primary I10    Paroxysmal atrial fibrillation (CMS/HCC) I48.0    RESOLVED: Paroxysmal SVT (supraventricular tachycardia) I47.10         BP is in acceptable range on current RX which She is tolerating well and agrees to continue   No bothersome episodes of PAF    OAC with Eliquis which is affordable    Metoprolol 100 mg BID     Most recent  mg/dL with ASCVD on CT chest    She is not keen on starting a statin    Red yeast rice    Mediterranean diet    Suggest statin vs PCSK9 inhibitor if no improvement in 6 months                01/16/24 at 2:23 PM - LOUIS Boyle-CNP      Orders:  No orders of the defined types  were placed in this encounter.        Followup Appts:  Future Appointments   Date Time Provider Department Center   2/9/2024  9:00 AM Sridhar Kirby MD [address]

## 2024-02-09 ENCOUNTER — OFFICE VISIT (OUTPATIENT)
Dept: PRIMARY CARE | Facility: CLINIC | Age: 69
End: 2024-02-09
Payer: MEDICARE

## 2024-02-09 VITALS
BODY MASS INDEX: 29.77 KG/M2 | TEMPERATURE: 97 F | OXYGEN SATURATION: 97 % | SYSTOLIC BLOOD PRESSURE: 114 MMHG | WEIGHT: 168 LBS | DIASTOLIC BLOOD PRESSURE: 80 MMHG | HEIGHT: 63 IN | HEART RATE: 66 BPM

## 2024-02-09 DIAGNOSIS — K21.9 GASTROESOPHAGEAL REFLUX DISEASE WITHOUT ESOPHAGITIS: ICD-10-CM

## 2024-02-09 DIAGNOSIS — I48.0 PAROXYSMAL ATRIAL FIBRILLATION (MULTI): ICD-10-CM

## 2024-02-09 DIAGNOSIS — J30.9 ALLERGIC RHINITIS, UNSPECIFIED SEASONALITY, UNSPECIFIED TRIGGER: Primary | ICD-10-CM

## 2024-02-09 DIAGNOSIS — M25.511 ACUTE PAIN OF RIGHT SHOULDER: ICD-10-CM

## 2024-02-09 PROCEDURE — 3074F SYST BP LT 130 MM HG: CPT | Performed by: FAMILY MEDICINE

## 2024-02-09 PROCEDURE — 3008F BODY MASS INDEX DOCD: CPT | Performed by: FAMILY MEDICINE

## 2024-02-09 PROCEDURE — 1036F TOBACCO NON-USER: CPT | Performed by: FAMILY MEDICINE

## 2024-02-09 PROCEDURE — 3079F DIAST BP 80-89 MM HG: CPT | Performed by: FAMILY MEDICINE

## 2024-02-09 PROCEDURE — 1126F AMNT PAIN NOTED NONE PRSNT: CPT | Performed by: FAMILY MEDICINE

## 2024-02-09 PROCEDURE — 1159F MED LIST DOCD IN RCRD: CPT | Performed by: FAMILY MEDICINE

## 2024-02-09 PROCEDURE — 99214 OFFICE O/P EST MOD 30 MIN: CPT | Performed by: FAMILY MEDICINE

## 2024-02-09 RX ORDER — LORATADINE 10 MG/1
10 TABLET ORAL DAILY
COMMUNITY
End: 2024-02-09 | Stop reason: SDUPTHER

## 2024-02-09 RX ORDER — OMEPRAZOLE 20 MG/1
20 CAPSULE, DELAYED RELEASE ORAL DAILY
Qty: 90 CAPSULE | Refills: 1 | Status: SHIPPED | OUTPATIENT
Start: 2024-02-09

## 2024-02-09 RX ORDER — LORATADINE 10 MG/1
10 TABLET ORAL DAILY
Qty: 90 TABLET | Refills: 1 | Status: SHIPPED | OUTPATIENT
Start: 2024-02-09

## 2024-02-09 ASSESSMENT — PATIENT HEALTH QUESTIONNAIRE - PHQ9
SUM OF ALL RESPONSES TO PHQ9 QUESTIONS 1 AND 2: 0
1. LITTLE INTEREST OR PLEASURE IN DOING THINGS: NOT AT ALL
2. FEELING DOWN, DEPRESSED OR HOPELESS: NOT AT ALL

## 2024-02-09 ASSESSMENT — ENCOUNTER SYMPTOMS
DEPRESSION: 0
LOSS OF SENSATION IN FEET: 0
OCCASIONAL FEELINGS OF UNSTEADINESS: 0

## 2024-02-09 NOTE — PROGRESS NOTES
"Subjective   Patient ID: Suzan Marie is a 68 y.o. female who presents for Follow-up (6 months/Questions from fall in Dec right arm).    HPI  Here for follow up  Here with right arm pain x 1 month, fell in December, tripped and fell backwards, didn't have pain initially but after that has been sore.  Needs refill of omeprazole  Has been having nasal congestion x 5-6 days, no fever, no nausea/vomiting/diarrhea. No sore throat.   Has history of A-fib, doing better, has not had any palpitations.     Review of Systems  General: no fever  Eyes: no blurry vision  ENT: see HPI  Resp:see HPI  Cardio: no chest pain, no palpitations  Abd: no nausea/vomiting  : no dysuria, no increased urinary frequency      /80   Pulse 66   Temp 36.1 °C (97 °F)   Ht 1.6 m (5' 3\")   Wt 76.2 kg (168 lb)   SpO2 97%   BMI 29.76 kg/m²       Objective   Physical Exam  Gen: NAD, alert  Head: normocephalic/atraumatic  Eyes: conjunctivae normal  Ears: canals clear bilaterally, TM normal   Nose: external nose normal   Oropharynx: clear   Resp: Clear to auscultation  CVS: Regular rate and rhythm  Abdomen: soft, NT, ND  Ext: no edema, NT of lower extremities, limited ROM of right shoulder  Neuro: gait normal     Assessment/Plan   Problem List Items Addressed This Visit       Allergic rhinitis - Primary    Relevant Medications    loratadine (Claritin) 10 mg tablet    Esophageal reflux    Relevant Medications    omeprazole (PriLOSEC) 20 mg DR capsule    Paroxysmal atrial fibrillation (CMS/HCC)  Continue current regimen, continue follow up with cardiology     Other Visit Diagnoses       Acute pain of right shoulder        Relevant Orders    XR shoulder right 2+ views (Completed)    Referral to Physical Therapy               "

## 2024-02-12 ENCOUNTER — HOSPITAL ENCOUNTER (OUTPATIENT)
Dept: RADIOLOGY | Facility: HOSPITAL | Age: 69
Discharge: HOME | End: 2024-02-12
Payer: MEDICARE

## 2024-02-12 DIAGNOSIS — M25.511 ACUTE PAIN OF RIGHT SHOULDER: ICD-10-CM

## 2024-02-12 PROCEDURE — 73030 X-RAY EXAM OF SHOULDER: CPT | Mod: RT

## 2024-02-12 PROCEDURE — 73030 X-RAY EXAM OF SHOULDER: CPT | Mod: RIGHT SIDE | Performed by: RADIOLOGY

## 2024-02-22 ENCOUNTER — HOSPITAL ENCOUNTER (OUTPATIENT)
Dept: RADIOLOGY | Facility: HOSPITAL | Age: 69
Discharge: HOME | End: 2024-02-22
Payer: MEDICARE

## 2024-02-22 ENCOUNTER — OFFICE VISIT (OUTPATIENT)
Dept: PRIMARY CARE | Facility: CLINIC | Age: 69
End: 2024-02-22
Payer: MEDICARE

## 2024-02-22 VITALS
OXYGEN SATURATION: 99 % | HEART RATE: 59 BPM | WEIGHT: 168.2 LBS | BODY MASS INDEX: 29.8 KG/M2 | HEIGHT: 63 IN | SYSTOLIC BLOOD PRESSURE: 123 MMHG | TEMPERATURE: 97 F | DIASTOLIC BLOOD PRESSURE: 82 MMHG

## 2024-02-22 DIAGNOSIS — M25.551 CHRONIC HIP PAIN, RIGHT: ICD-10-CM

## 2024-02-22 DIAGNOSIS — R39.9 URINARY SYMPTOM OR SIGN: ICD-10-CM

## 2024-02-22 DIAGNOSIS — M54.50 CHRONIC LOW BACK PAIN WITHOUT SCIATICA, UNSPECIFIED BACK PAIN LATERALITY: ICD-10-CM

## 2024-02-22 DIAGNOSIS — M54.50 CHRONIC LOW BACK PAIN WITHOUT SCIATICA, UNSPECIFIED BACK PAIN LATERALITY: Primary | ICD-10-CM

## 2024-02-22 DIAGNOSIS — G89.29 CHRONIC LOW BACK PAIN WITHOUT SCIATICA, UNSPECIFIED BACK PAIN LATERALITY: Primary | ICD-10-CM

## 2024-02-22 DIAGNOSIS — G89.29 CHRONIC HIP PAIN, RIGHT: ICD-10-CM

## 2024-02-22 DIAGNOSIS — G89.29 CHRONIC LOW BACK PAIN WITHOUT SCIATICA, UNSPECIFIED BACK PAIN LATERALITY: ICD-10-CM

## 2024-02-22 LAB
POC APPEARANCE, URINE: CLEAR
POC BILIRUBIN, URINE: NEGATIVE
POC BLOOD, URINE: NEGATIVE
POC COLOR, URINE: YELLOW
POC GLUCOSE, URINE: NEGATIVE MG/DL
POC KETONES, URINE: NEGATIVE MG/DL
POC LEUKOCYTES, URINE: ABNORMAL
POC NITRITE,URINE: NEGATIVE
POC PH, URINE: 7 PH
POC PROTEIN, URINE: NEGATIVE MG/DL
POC SPECIFIC GRAVITY, URINE: 1.01
POC UROBILINOGEN, URINE: 0.2 EU/DL

## 2024-02-22 PROCEDURE — 72110 X-RAY EXAM L-2 SPINE 4/>VWS: CPT

## 2024-02-22 PROCEDURE — 3079F DIAST BP 80-89 MM HG: CPT | Performed by: FAMILY MEDICINE

## 2024-02-22 PROCEDURE — 1159F MED LIST DOCD IN RCRD: CPT | Performed by: FAMILY MEDICINE

## 2024-02-22 PROCEDURE — 3008F BODY MASS INDEX DOCD: CPT | Performed by: FAMILY MEDICINE

## 2024-02-22 PROCEDURE — 1126F AMNT PAIN NOTED NONE PRSNT: CPT | Performed by: FAMILY MEDICINE

## 2024-02-22 PROCEDURE — 99213 OFFICE O/P EST LOW 20 MIN: CPT | Performed by: FAMILY MEDICINE

## 2024-02-22 PROCEDURE — 73502 X-RAY EXAM HIP UNI 2-3 VIEWS: CPT | Mod: RT

## 2024-02-22 PROCEDURE — 81003 URINALYSIS AUTO W/O SCOPE: CPT | Performed by: FAMILY MEDICINE

## 2024-02-22 PROCEDURE — 1124F ACP DISCUSS-NO DSCNMKR DOCD: CPT | Performed by: FAMILY MEDICINE

## 2024-02-22 PROCEDURE — 73502 X-RAY EXAM HIP UNI 2-3 VIEWS: CPT | Mod: RIGHT SIDE | Performed by: RADIOLOGY

## 2024-02-22 PROCEDURE — 1036F TOBACCO NON-USER: CPT | Performed by: FAMILY MEDICINE

## 2024-02-22 PROCEDURE — 3074F SYST BP LT 130 MM HG: CPT | Performed by: FAMILY MEDICINE

## 2024-02-22 PROCEDURE — 72110 X-RAY EXAM L-2 SPINE 4/>VWS: CPT | Performed by: RADIOLOGY

## 2024-02-22 RX ORDER — PREDNISONE 20 MG/1
TABLET ORAL
Qty: 18 TABLET | Refills: 0 | Status: SHIPPED | OUTPATIENT
Start: 2024-02-22

## 2024-02-22 RX ORDER — TIZANIDINE 2 MG/1
2 TABLET ORAL NIGHTLY PRN
Qty: 30 TABLET | Refills: 3 | Status: SHIPPED | OUTPATIENT
Start: 2024-02-22

## 2024-02-22 NOTE — PROGRESS NOTES
"Subjective   Patient ID: Suzan Marie is a 68 y.o. female who presents for Follow-up (Rt hip pain/Back pain for a long time has to  leg and move it).  HPI  Here for urgent visit   Has been having right hip pain off and on for years, but for the past  months has been getting worse. Pain so bad she has trouble even lifting it. Also has been having pain across her back. No radiation of pain, no numbness/tingling. No trauma.   Had xray done in 2018 which showed mild OA of right hip    Review of Systems  General: no fever  Eyes: no blurry vision  ENT: no sore throat, no ear pain  Resp: no cough, sob or wheezing  Cardio: no chest pain, no palpitations  Abd: no nausea/vomiting  : no dysuria, no increased urinary frequency      /82   Pulse 59   Temp 36.1 °C (97 °F)   Ht 1.6 m (5' 3\")   Wt 76.3 kg (168 lb 3.2 oz)   SpO2 99%   BMI 29.80 kg/m²       Objective   Physical Exam  Gen: NAD, alert  Head: normocephalic/atraumatic  Eyes: conjunctivae normal  Ears: canals clear bilaterally, TM normal   Nose: external nose normal   Oropharynx: clear   Resp: Clear to auscultation  CVS: Regular rate and rhythm  Abdomen: soft, NT, ND  Ext: no edema, NT of lower extremities, limited ROM of hip  Neuro: gait normal     Assessment/Plan   Problem List Items Addressed This Visit    None  Visit Diagnoses       Chronic low back pain without sciatica, unspecified back pain laterality    -  Primary    Relevant Medications    tiZANidine (Zanaflex) 2 mg tablet    predniSONE (Deltasone) 20 mg tablet    Other Relevant Orders    XR lumbar spine 2-3 views    Referral to Physical Therapy    Urinary symptom or sign        Relevant Orders    POCT UA Automated manually resulted (Completed)    Chronic hip pain, right        Relevant Orders    XR hip right with pelvis when performed 2 or 3 views    Referral to Physical Therapy               "

## 2024-02-27 ENCOUNTER — TELEPHONE (OUTPATIENT)
Dept: PRIMARY CARE | Facility: CLINIC | Age: 69
End: 2024-02-27
Payer: MEDICARE

## 2024-03-08 ENCOUNTER — EVALUATION (OUTPATIENT)
Dept: PHYSICAL THERAPY | Facility: HOSPITAL | Age: 69
End: 2024-03-08
Payer: MEDICARE

## 2024-03-08 DIAGNOSIS — M25.511 ACUTE PAIN OF RIGHT SHOULDER: Primary | ICD-10-CM

## 2024-03-08 DIAGNOSIS — G89.29 CHRONIC HIP PAIN, RIGHT: ICD-10-CM

## 2024-03-08 DIAGNOSIS — M54.50 CHRONIC LOW BACK PAIN WITHOUT SCIATICA, UNSPECIFIED BACK PAIN LATERALITY: ICD-10-CM

## 2024-03-08 DIAGNOSIS — M25.551 CHRONIC HIP PAIN, RIGHT: ICD-10-CM

## 2024-03-08 DIAGNOSIS — G89.29 CHRONIC LOW BACK PAIN WITHOUT SCIATICA, UNSPECIFIED BACK PAIN LATERALITY: ICD-10-CM

## 2024-03-08 PROCEDURE — 97110 THERAPEUTIC EXERCISES: CPT | Mod: GP | Performed by: PHYSICAL THERAPIST

## 2024-03-08 PROCEDURE — 97161 PT EVAL LOW COMPLEX 20 MIN: CPT | Mod: GP | Performed by: PHYSICAL THERAPIST

## 2024-03-08 ASSESSMENT — PAIN - FUNCTIONAL ASSESSMENT: PAIN_FUNCTIONAL_ASSESSMENT: 0-10

## 2024-03-08 ASSESSMENT — PAIN DESCRIPTION - DESCRIPTORS: DESCRIPTORS: BURNING

## 2024-03-08 ASSESSMENT — PAIN SCALES - GENERAL: PAINLEVEL_OUTOF10: 5 - MODERATE PAIN

## 2024-03-08 ASSESSMENT — ENCOUNTER SYMPTOMS
DEPRESSION: 0
OCCASIONAL FEELINGS OF UNSTEADINESS: 0
LOSS OF SENSATION IN FEET: 0

## 2024-03-08 NOTE — PROGRESS NOTES
Physical Therapy    Physical Therapy Evaluation and Treatment      Patient Name: Suzan Marie  MRN: [id number]  Today's Date: 3/8/2024  Time Calculation  Start Time: 1245  Stop Time: 1328  Time Calculation (min): 43 min    Assessment:  PT Assessment  PT Assessment Results: Decreased strength, Pain, Decreased range of motion  Rehab Prognosis: Good  Evaluation/Treatment Tolerance: Patient tolerated treatment well, Patient limited by pain   Patient demonstrated impaired shoulder range of motion and strength. Patient demonstrated limited motion due to anterior shoulder pain. Patient demonstrated point tenderness over her bicep tendon. Skilled physical therapy is warranted to address the above stated impairments, so the patient can perform functional activities and work duties without increased pain or difficulty.     Plan:  OP PT Plan  Treatment/Interventions: Education/ Instruction, Manual therapy, Therapeutic activities, Therapeutic exercises  PT Plan: Skilled PT  PT Frequency: 1 time per week  Duration: 4 weeks  Onset Date: 02/09/24  Certification Period Start Date: 03/08/24  Certification Period End Date: 05/03/24  Number of Treatments Authorized: 1 of 4  Rehab Potential: Good  Plan of Care Agreement: Patient    Current Problem:   1. Acute pain of right shoulder  Referral to Physical Therapy    Follow Up In Physical Therapy      2. Chronic low back pain without sciatica, unspecified back pain laterality  Referral to Physical Therapy      3. Chronic hip pain, right  Referral to Physical Therapy          Subjective    General:  General  Reason for Referral: right shoulder pain  Referred By: Dr. Kiryb  Past Medical History Relevant to Rehab: right shoulder surgery at least 10 years ago  Patient is a 68 year old female presenting with right shoulder pain. Patient had a fall in December. She states that she landed on her back and her shoulder's been hurting since the fall. She has a prior medical history  including: right shoulder surgery. Patient is unsure what the surgery was. Patient verbalized concern over damage to the prior surgery.    Precautions:  Precautions  STEADI Fall Risk Score (The score of 4 or more indicates an increased risk of falling): 1  Medical Precautions: Fall precautions    Pain:  Pain Assessment  Pain Assessment: 0-10  Pain Score: 5 - Moderate pain  Pain Location: Shoulder  Pain Orientation: Right  Pain Descriptors: Burning  Patient's Stated Pain Goal: No pain  Pain Interventions: Cold applied    Home Living:   Patient lives with her . No mobility issues.    Prior Level of Function:  Prior Function Per Pt/Caregiver Report  Level of [place]: Independent with ADLs and functional transfers, Independent with homemaking with ambulation  Vocational: Retired  Hand Dominance: Right    Objective     Cervical AROM  Cervical flexion: (80°): 48  Cervical extension: (50°): 48  Cervical Rotation: (80°): 67  Cervical rotation left: (80°): 61  Cervical sidebend right: (45°): 25  Cervical sidebend left: (45°): 32  Shoulder AROM  R Shoulder flexion: (180°): 141 (pain)  L Shoulder flexion: (180°): 151  R shoulder abduction: (180°): 98  L Shoulder abduction: (180°): 180  R Shoulder ER: (90°): 78  L shoulder ER: (90°): 90  R shoulder IR: (70°): T12  L shoulder IR: (70°): T9    Shoulder Strength  R shoulder flexion: (5/5): 3+/5  L shoulder flexion: (5/5): 4+/5  R shoulder abduction: (5/5): 3+/5  L shoulder abduction: (5/5): 4+/5  R shoulder ER: (5/5): 4/5  L shoulder ER: (5/5): 4/5  R shoulder IR: (5/5) : 4/5  L shoulder IR: (5/5): 4/5    Shoulder Special  UE Special Tests Comments: RUE: Yamile [place] + cross body, Neer +    Outcome Measures:  Other Measures  Disability of Arm Shoulder Hand (DASH): 43.18     Treatments:  Therapeutic Exercise  Therapeutic Exercise Performed: Yes  Therapeutic Exercise Activity 1: scap retraction x5  Therapeutic Exercise Activity 2: shoulder flex aarom  "x5  Therapeutic Exercise Activity 3: doorway pec stretch 2x15\"  Therapeutic Exercise Activity 4: shoulder flex iso at wall x3    EDUCATION:  Outpatient Education  Individual(s) Educated: Patient  Education Provided: Anatomy, Home Exercise Program, POC, Posture (Access Code: [email or web address])  Patient/Caregiver Demonstrated Understanding: yes  Plan of Care Discussed and Agreed Upon: yes  Patient Response to Education: Patient/Caregiver Verbalized Understanding of Information    Goals:  Active       PT Problem       Patient will achieve right shoulder flexion of at least 150 degrees       Start:  03/08/24    Expected End:  04/05/24            Patient will achieve right shoulder abduction of at least 120 degrees       Start:  03/08/24    Expected End:  04/05/24            Patient will achieve right shoulder external rotation of 90 degrees in 45 degrees abd       Start:  03/08/24    Expected End:  04/05/24            Patient will achieve right shoulder flexion strength of at least 4+/5       Start:  03/08/24    Expected End:  04/05/24            Patient will achieve right shoulder abduction strength of at least 4+/5       Start:  03/08/24    Expected End:  04/05/24            Patient will demonstrate independence in home program for support of progression       Start:  03/08/24    Expected End:  03/22/24            Patient will report pain of no more than 2/10 demonstrating a reduction of overall pain       Start:  03/08/24    Expected End:  04/05/24            Patient will show a significant change in Quick Dash (43.18 to 35.18) patient reported outcome tool to demonstrate subjective imporovement       Start:  03/08/24    Expected End:  04/05/24                      "

## 2024-03-14 ENCOUNTER — TREATMENT (OUTPATIENT)
Dept: PHYSICAL THERAPY | Facility: HOSPITAL | Age: 69
End: 2024-03-14
Payer: MEDICARE

## 2024-03-14 DIAGNOSIS — M25.511 ACUTE PAIN OF RIGHT SHOULDER: ICD-10-CM

## 2024-03-14 PROCEDURE — 97110 THERAPEUTIC EXERCISES: CPT | Mod: GP | Performed by: PHYSICAL THERAPIST

## 2024-03-14 PROCEDURE — 97140 MANUAL THERAPY 1/> REGIONS: CPT | Mod: GP | Performed by: PHYSICAL THERAPIST

## 2024-03-14 ASSESSMENT — PAIN DESCRIPTION - DESCRIPTORS: DESCRIPTORS: SHARP

## 2024-03-14 ASSESSMENT — PAIN - FUNCTIONAL ASSESSMENT: PAIN_FUNCTIONAL_ASSESSMENT: 0-10

## 2024-03-14 ASSESSMENT — PAIN SCALES - GENERAL: PAINLEVEL_OUTOF10: 2

## 2024-03-14 NOTE — PROGRESS NOTES
Physical Therapy    Physical Therapy Treatment    Patient Name: Suzan Marie  MRN: [id number]  Today's Date: 3/14/2024  Time Calculation  Start Time: 1045  Stop Time: 1125  Time Calculation (min): 40 min      Assessment:  PT Assessment  PT Assessment Results: Decreased strength, Pain, Decreased range of motion  Rehab Prognosis: Good  Evaluation/Treatment Tolerance: Patient limited by pain  Assessment Comment: Pt tolerated exercises with improved right shoulder mobility and strength post manual interventions. Pt able to perform UBE and Pulleys without increased symptoms but limited range of motion ~90 degrees scaption and flexion before symptoms would begin. Educated patient on dry needling and given informaiton/consent forms. Will see patient next appointment and utilize dry needling for musculature immobility and pain. Continue to progress as tolerated. No complaints of pain post session at rest, still pain with movement above 90 degrees active flexion and across body reaching.  Plan:  OP PT Plan  Treatment/Interventions: Dry needling, Education/ Instruction, Electrical stimulation, Hot pack, Neuromuscular re-education, Therapeutic activities, Therapeutic exercises, Manual therapy  PT Plan: Skilled PT  PT Frequency: 1 time per week  Duration: 4 weeks  Onset Date: 02/09/24  Certification Period Start Date: 03/08/24  Certification Period End Date: 05/03/24  Number of Treatments Authorized: 2 of 4  Rehab Potential: Good  Plan of Care Agreement: Patient    Current Problem  1. Acute pain of right shoulder  Follow Up In Physical Therapy          General  PT  Visit  PT Received On: 03/14/24  Response to Previous Treatment: Patient with no complaints from previous session.  General  Reason for Referral: right shoulder pain  Referred By: Dr. Kirby  Past Medical History Relevant to Rehab: right shoulder surgery at least 10 years ago  General Comment: Pt with complaints of 2/10 pain at rest and still with increased  sharp pains with right shoulder flexion and reach across body. Pt feels HEP was tolerable but increased pain during performance with aarom cane flexion exercise. No new complaints.    Subjective    Precautions     Vital Signs     Pain  Pain Assessment  Pain Assessment: 0-10  Pain Score: 2  Pain Location: Shoulder  Pain Orientation: Right  Pain Descriptors: Sharp    Objective       Treatments:  Therapeutic Exercise  Therapeutic Exercise Performed: Yes  Therapeutic Exercise Activity 2: shoulder flex aarom x10  Therapeutic Exercise Activity 3: supine press plus x 10  Therapeutic Exercise Activity 4: pulleys 3 min  Therapeutic Exercise Activity 5: scap retraction 2 x 10  Therapeutic Exercise Activity 6: UBE 2 fwd/2 back    Manual Therapy  Manual Therapy Performed: Yes  Manual Therapy Activity 1: PROM all planes R shoulder  Manual Therapy Activity 2: gentle distraction and GH A/P and Inferior mobs Grade 1 and 2    OP EDUCATION:  Outpatient Education  Individual(s) Educated: Patient  Education Provided: Anatomy, Home Exercise Program, POC, Physiology  Patient/Caregiver Demonstrated Understanding: yes  Plan of Care Discussed and Agreed Upon: yes  Patient Response to Education: Patient/Caregiver Verbalized Understanding of Information    Goals:  Active       PT Problem       Patient will achieve right shoulder flexion of at least 150 degrees       Start:  03/08/24    Expected End:  04/05/24            Patient will achieve right shoulder abduction of at least 120 degrees       Start:  03/08/24    Expected End:  04/05/24            Patient will achieve right shoulder external rotation of 90 degrees in 45 degrees abd       Start:  03/08/24    Expected End:  04/05/24            Patient will achieve right shoulder flexion strength of at least 4+/5       Start:  03/08/24    Expected End:  04/05/24            Patient will achieve right shoulder abduction strength of at least 4+/5       Start:  03/08/24    Expected End:  04/05/24             Patient will demonstrate independence in home program for support of progression       Start:  03/08/24    Expected End:  03/22/24            Patient will report pain of no more than 2/10 demonstrating a reduction of overall pain       Start:  03/08/24    Expected End:  04/05/24            Patient will show a significant change in Quick Dash (43.18 to 35.18) patient reported outcome tool to demonstrate subjective imporovement       Start:  03/08/24    Expected End:  04/05/24

## 2024-03-21 ENCOUNTER — TREATMENT (OUTPATIENT)
Dept: PHYSICAL THERAPY | Facility: HOSPITAL | Age: 69
End: 2024-03-21
Payer: MEDICARE

## 2024-03-21 ENCOUNTER — TELEPHONE (OUTPATIENT)
Dept: CARDIOLOGY | Facility: CLINIC | Age: 69
End: 2024-03-21

## 2024-03-21 DIAGNOSIS — M25.511 ACUTE PAIN OF RIGHT SHOULDER: ICD-10-CM

## 2024-03-21 PROCEDURE — 97140 MANUAL THERAPY 1/> REGIONS: CPT | Mod: GP | Performed by: PHYSICAL THERAPIST

## 2024-03-21 PROCEDURE — 97110 THERAPEUTIC EXERCISES: CPT | Mod: GP | Performed by: PHYSICAL THERAPIST

## 2024-03-21 ASSESSMENT — PAIN SCALES - GENERAL: PAINLEVEL_OUTOF10: 2

## 2024-03-21 ASSESSMENT — PAIN - FUNCTIONAL ASSESSMENT: PAIN_FUNCTIONAL_ASSESSMENT: 0-10

## 2024-03-26 DIAGNOSIS — I48.0 PAROXYSMAL ATRIAL FIBRILLATION (MULTI): ICD-10-CM

## 2024-03-27 RX ORDER — METOPROLOL TARTRATE 100 MG/1
100 TABLET ORAL 2 TIMES DAILY
Qty: 180 TABLET | Refills: 2 | Status: SHIPPED | OUTPATIENT
Start: 2024-03-27

## 2024-03-28 ENCOUNTER — TREATMENT (OUTPATIENT)
Dept: PHYSICAL THERAPY | Facility: HOSPITAL | Age: 69
End: 2024-03-28
Payer: MEDICARE

## 2024-03-28 DIAGNOSIS — M25.511 ACUTE PAIN OF RIGHT SHOULDER: Primary | ICD-10-CM

## 2024-03-28 DIAGNOSIS — M54.9 DORSALGIA: ICD-10-CM

## 2024-03-28 PROCEDURE — 97110 THERAPEUTIC EXERCISES: CPT | Mod: GP | Performed by: PHYSICAL THERAPIST

## 2024-03-28 ASSESSMENT — PAIN DESCRIPTION - DESCRIPTORS: DESCRIPTORS: SHARP;ACHING

## 2024-03-28 ASSESSMENT — PAIN - FUNCTIONAL ASSESSMENT
PAIN_FUNCTIONAL_ASSESSMENT: 0-10
PAIN_FUNCTIONAL_ASSESSMENT: 0-10

## 2024-03-28 ASSESSMENT — PAIN SCALES - GENERAL
PAINLEVEL_OUTOF10: 5 - MODERATE PAIN
PAINLEVEL_OUTOF10: 5 - MODERATE PAIN

## 2024-03-28 NOTE — PROGRESS NOTES
Physical Therapy Treatment    Patient Name: Suzan Marie  MRN: [id number]  Today's Date: 3/28/2024  Time Calculation  Start Time: 1300  Stop Time: 1322  Time Calculation (min): 22 min    PT Therapeutic Procedures Time Entry  Therapeutic Exercise Time Entry: 22    Current Problem  1. Acute pain of right shoulder  Follow Up In Physical Therapy      2. Dorsalgia  Follow Up In Physical Therapy          General  Reason for Referral: low back pain  Referred By: Dr. Kirby  Past Medical History Relevant to Rehab: right shoulder surgery at least 10 years ago    Subjective   Current Condition:   Better  Patient reports that her low back is giving her more problems than her shoulder at this time. Patient is requesting us focus on her low back pain.    Performing HEP?: Yes    Precautions  Precautions  Medical Precautions: Fall precautions  Pain  Pain Assessment: 0-10  Pain Score: 5 - Moderate pain  Pain Location: Back  Pain Orientation: Lower  Pain Radiating Towards: lateral right thigh  Pain Descriptors: Sharp, Aching  Pain Frequency: Constant/continuous    Objective       Shoulder AROM  R Shoulder flexion: (180°): 160  L Shoulder flexion: (180°): 158  R shoulder abduction: (180°): 122  L Shoulder abduction: (180°): 180  R Shoulder ER: (90°): 86  L shoulder ER: (90°): 90    Shoulder Strength  R shoulder flexion: (5/5): 4/5  L shoulder flexion: (5/5): 4+/5  R shoulder abduction: (5/5): 4/5  L shoulder abduction: (5/5): 4+/5  R shoulder ER: (5/5): 4+/5  L shoulder ER: (5/5): 4+/5  R shoulder IR: (5/5) : 4+/5  L shoulder IR: (5/5): 4+/5    Outcome Measures:  Other Measures  Disability of Arm Shoulder Hand (DASH): 27.27  Dizziness Handicap Inventory: .  Oswestry Disablity Index (PING): 13.33% (6/45)    Treatments:    Therapeutic Exercise  Therapeutic Exercise Performed: Yes  Therapeutic Exercise Activity 1: LTR x5  Therapeutic Exercise Activity 2: PPT x5  Therapeutic Exercise Activity 3: seated Glut stretch  x5    EDUCATION:   Individual(s) Educated: Patient  Education Provided: yes  Handout(s) Provided: Scanned into chart  Home Program: Access Code: [email or web address]  Risk and Benefits Discussed with Patient/Caregiver/Other: Yes   Patient/Caregiver Demonstrated Understanding: Yes   Patient Response to Education: Patient/Caregiver verbalized understanding of information and Patient/Caregiver performed return demonstration of exercises/activities    Assessment: patient has partially met her physical therapy goals and is appropriate to discharge from physical therapy at this time. Patient agreed with plan.  PT Assessment  PT Assessment Results: Decreased strength, Pain, Decreased range of motion  Rehab Prognosis: Good  Evaluation/Treatment Tolerance: Patient tolerated treatment well    Plan: Continue with POC. Discharge from physical therapy at this time to focus on her low back pain.  OP PT Plan  Treatment/Interventions: Education/ Instruction, Electrical stimulation, Hot pack, Neuromuscular re-education, Therapeutic activities, Therapeutic exercises, Manual therapy  PT Plan: Skilled PT  PT Frequency: 1 time per week  Duration: 4 weeks  Onset Date: 02/9/24  Certification Period Start Date: 03/8/24  Certification Period End Date: 05/03/24  Number of Treatments Authorized: 4 of 4  Rehab Potential: Good  Plan of Care Agreement: Patient    Goals:  Active       PT Problem       Patient will achieve right shoulder flexion of at least 150 degrees (Met)       Start:  03/08/24    Expected End:  04/05/24    Resolved:  03/28/24         Patient will achieve right shoulder abduction of at least 120 degrees (Met)       Start:  03/08/24    Expected End:  04/05/24    Resolved:  03/28/24         Patient will achieve right shoulder external rotation of 90 degrees in 45 degrees abd (Progressing)       Start:  03/08/24    Expected End:  04/05/24            Patient will achieve right shoulder flexion strength of at least 4+/5 (Progressing)        Start:  03/08/24    Expected End:  04/05/24            Patient will achieve right shoulder abduction strength of at least 4+/5 (Progressing)       Start:  03/08/24    Expected End:  04/05/24            Patient will demonstrate independence in home program for support of progression (Met)       Start:  03/08/24    Expected End:  03/22/24    Resolved:  03/28/24         Patient will report pain of no more than 2/10 demonstrating a reduction of overall pain (Progressing)       Start:  03/08/24    Expected End:  04/05/24            Patient will show a significant change in Quick Dash (43.18 to 35.18) patient reported outcome tool to demonstrate subjective imporovement       Start:  03/08/24    Expected End:  04/05/24                   Frederic Hopkins, PT

## 2024-03-28 NOTE — PROGRESS NOTES
"  Physical Therapy  Physical Therapy Evaluation and Treatment    Patient Name: Suzan Marie  MRN: [id number]  Today's Date: 3/28/2024  Time Calculation  Start Time: 1324  Stop Time: 1350  Time Calculation (min): 26 min    Today's Charges     PT Therapeutic Procedures Time Entry  Therapeutic Exercise Time Entry: 26     Insurance:  Visit number: 1 of 6  Authorization info: no auth required  Insurance Type: Medicare    Current Problem  1. Dorsalgia  Follow Up In Physical Therapy      2. Acute pain of right shoulder  Follow Up In Physical Therapy          General:  General  Reason for Referral: low back pain  Referred By: Dr. Kirby  Past Medical History Relevant to Rehab: right shoulder surgery at least 10 years ago      Precautions:   Precautions  Medical Precautions: Fall precautions    Medical History Form: Reviewed (scanned into chart)    Subjective:   Subjective   Chief Complaint: Patient is a 68 year old female who presents to clinic with complaints of low back pain. Patient was originally being seen for her shoulder, but has an order for physical therapy to treat her low back pain. Patient prefers to focus on her low back pain at this time.  Onset Date: 2/22/2024  ELZBIETA: Chronic    Current Condition:   Worse    Pain:  Pain Assessment: 0-10  Pain Score: 5 - Moderate pain  Pain Location: Back  Pain Orientation: Lower  Pain Radiating Towards: lateral right thigh  Pain Descriptors: Sharp, Aching  Pain Frequency: Constant/continuous  Aggravating Factors:  Squatting and Bending Forward  Relieving Factors:  Heat    Relevant Information (PMH & Previous Tests/Imaging): xray on 2/22/2024 indicating, \"Moderate lumbar spondylosis.  Grade 1 retrolisthesis L2 on L3. Levocurvature of the lumbar spine.\"  Previous Interventions/Treatments: None    Prior Level of Function (PLOF)  Patient previously independent with all ADLs  Exercise/Physical Activity: walk  Work/School: retired    Hand Dominance: right     Patients " Living Environment: Reviewed and no concern    Primary Language: English    Patient's Goal(s) for Therapy: decrease pain, sleep on her side    Red Flags: Do you have any of the following? No  Fever/chills, unexplained weight changes, dizziness/fainting, unexplained change in bowel or bladder functions, unexplained malaise or muscle weakness, night pain/sweats, numbness or tingling    Objective:  Objective     Lumbar AROM  Lumbar flexion: (60°): min restriction (pain)  Lumbar extension (25°): mod restrictoin (pain)  Lumbar rotation right (30°): WFL  Lumbar rotation left (30°): WFL  Lumbar sidebend right (25°): min restriction  Lumbar sidebend left (25°): WFL    Hip PROM  R hip flexion: (125°): min restriction  L hip flexion: (125°): min restriction  R hip abduction: (45°): WFL  L hip abduction: (45°): WFL  R hip ER: (45°): WFL  L hip ER: (45°): WFL  R hip IR: (45°): WFL  L hip IR: (45°): WFL    Specific Lower Extremity MMT  R Iliopsoas: (5/5): 4+/5  L Iliopsoas: (5/5): 4+/5  R Gluteals (sidelying): (5/5): 4+/5  L Gluteals (sidelying): (5/5): 4+/5    Knee MMT  R knee flexion: (5/5): 5/5  L knee flexion: (5/5): 5/5  R knee extension: (5/5): 5/5  L knee extension: (5/5): 5/5    Posture:  normal    Lower Extremity Functional Movements  Transfers: Independent  Gait: none  Assistive Device no device    Outcome Measures:  Other Measures  Oswestry Disablity Index (PING): 13.33% (6/45)     Treatment Performed: Therapeutic Exercise  Therapeutic Exercise Performed: Yes  Therapeutic Exercise Activity 1: LTR x5  Therapeutic Exercise Activity 2: PPT x5  Therapeutic Exercise Activity 3: seated Glut stretch x5    EDUCATION:   Individual(s) Educated: patient   Education Provided: Home exercise program, plan of care, activity modifications, pain management, and injury pathology  Handout(s) Provided: Scanned into chart  Home Program: Access Code: [email or web address]  Risk and Benefits Discussed with Patient/Caregiver/Other: Yes   Patient/Caregiver  Demonstrated Understanding: Yes   Plan of Care Discussed and Agreed Upon: Yes   Patient Response to Education: Patient/Caregiver verbalized understanding of information and Patient/Caregiver performed return demonstration of exercises/activities    Assessment: Patient demonstrated impaired range of motion, strength, and flexibility. Patient presents with signs and symptoms consistent with low back pain with occasional radicular symptoms, resulting in limited participation in pain-free ADLs and inability to perform at their prior level of function. Pt would benefit from physical therapy to address the impairments found & listed previously in the objective section in order to return to safe and pain-free ADLs and prior level of function.  PT Assessment Results: Decreased strength, Pain, Decreased range of motion  Rehab Prognosis: Good  Evaluation/Treatment Tolerance: Patient tolerated treatment well    Complexity: low    Plan:  Treatment/Interventions: Education/ Instruction, Electrical stimulation, Hot pack, Neuromuscular re-education, Therapeutic activities, Therapeutic exercises, Manual therapy  PT Plan: Skilled PT  PT Frequency: 1 time per week  Duration: 6 weeks  Onset Date: 02/20/24  Certification Period Start Date: 03/28/24  Certification Period End Date: 05/09/24  Number of Treatments Authorized: 1 of 6  Rehab Potential: Good  Plan of Care Agreement: Patient  Planned Interventions include: therapeutic exercise, self-care home management, manual therapy, therapeutic activities, gait training, neuromuscular coordination, vasopneumatic, dry needling, aquatic therapy    Goals: Set and discussed today  Active       PT Problem       Patient will demonstrate improved hamstring flexibility in bilateral lower extremities WFL.       Start:  03/28/24    Expected End:  05/09/24            Patient will maintain single leg stand on each leg for at least 30 sec without us of upper extremity support.       Start:  03/28/24     Expected End:  05/09/24            Patient will achieve spinal flexion to WFL       Start:  03/28/24    Expected End:  05/09/24            Patient will achieve spinal extension to WFL       Start:  03/28/24    Expected End:  05/09/24            Patient will achieve bilateral spinal side bending ROM WFL       Start:  03/28/24    Expected End:  05/09/24            Patient will demonstrate independence in home program for support of progression       Start:  03/28/24    Expected End:  04/11/24            Patient will report pain of no more than 2/10 demonstrating a reduction of overall pain       Start:  03/28/24    Expected End:  05/09/24            Patient will show a significant change in PING (13.33% to 0%) patient reported outcome tool to demonstrate subjective imporovement       Start:  03/28/24    Expected End:  05/09/24                Plan of care was developed with input and agreement by the patient          Frederic Hopkins PT

## 2024-04-04 ENCOUNTER — APPOINTMENT (OUTPATIENT)
Dept: PHYSICAL THERAPY | Facility: HOSPITAL | Age: 69
End: 2024-04-04
Payer: MEDICARE

## 2024-04-10 DIAGNOSIS — I48.0 PAROXYSMAL ATRIAL FIBRILLATION (MULTI): Primary | ICD-10-CM

## 2024-04-10 RX ORDER — RIVAROXABAN 20 MG/1
20 TABLET, FILM COATED ORAL NIGHTLY
Qty: 90 TABLET | Refills: 2 | Status: SHIPPED | OUTPATIENT
Start: 2024-04-10

## 2024-04-11 ENCOUNTER — APPOINTMENT (OUTPATIENT)
Dept: PHYSICAL THERAPY | Facility: HOSPITAL | Age: 69
End: 2024-04-11
Payer: MEDICARE

## 2024-04-18 ENCOUNTER — APPOINTMENT (OUTPATIENT)
Dept: PHYSICAL THERAPY | Facility: HOSPITAL | Age: 69
End: 2024-04-18
Payer: MEDICARE

## 2024-04-25 ENCOUNTER — APPOINTMENT (OUTPATIENT)
Dept: PHYSICAL THERAPY | Facility: HOSPITAL | Age: 69
End: 2024-04-25
Payer: MEDICARE

## 2024-06-17 ENCOUNTER — DOCUMENTATION (OUTPATIENT)
Dept: PHYSICAL THERAPY | Facility: HOSPITAL | Age: 69
End: 2024-06-17
Payer: MEDICARE

## 2024-06-17 NOTE — PROGRESS NOTES
Physical Therapy    Discharge Summary    Name: Suzan Marie  MRN: [id number]  : 1955  Date: 24    Discharge Summary: PT    Discharge Information: Date of discharge 2024, Date of last visit 3/28/2024, Date of evaluation 3/8/2024, Number of attended visits 4, Referred by Dr. Kirby, and Referred for right shoulder pain, low back pain     Therapy Summary: Patient is a 68 year old female presenting with right shoulder pain. Patient had a fall in December. She states that she landed on her back and her shoulder's been hurting since the fall. She has a prior medical history including: right shoulder surgery. Patient is unsure what the surgery was. Patient verbalized concern over damage to the prior surgery.     Patient is a 68 year old female who presents to clinic with complaints of low back pain. Patient was originally being seen for her shoulder, but has an order for physical therapy to treat her low back pain. Patient prefers to focus on her low back pain at this time.     Discharge Status: Met most of her physical therapy goals for her shoulder. Only attended 1 visit for her low back pain.     Rehab Discharge Reason: Failed to schedule and/or keep follow-up appointment(s)

## 2024-08-23 ENCOUNTER — APPOINTMENT (OUTPATIENT)
Dept: PRIMARY CARE | Facility: CLINIC | Age: 69
End: 2024-08-23
Payer: MEDICARE

## 2024-08-23 ENCOUNTER — LAB (OUTPATIENT)
Dept: LAB | Facility: LAB | Age: 69
End: 2024-08-23
Payer: MEDICARE

## 2024-08-23 VITALS
DIASTOLIC BLOOD PRESSURE: 84 MMHG | TEMPERATURE: 97.3 F | SYSTOLIC BLOOD PRESSURE: 128 MMHG | OXYGEN SATURATION: 97 % | BODY MASS INDEX: 29.45 KG/M2 | HEIGHT: 63 IN | WEIGHT: 166.2 LBS | HEART RATE: 54 BPM

## 2024-08-23 DIAGNOSIS — Z00.00 MEDICARE ANNUAL WELLNESS VISIT, SUBSEQUENT: Primary | ICD-10-CM

## 2024-08-23 DIAGNOSIS — E78.2 MIXED HYPERLIPIDEMIA: ICD-10-CM

## 2024-08-23 DIAGNOSIS — Z12.31 ENCOUNTER FOR SCREENING MAMMOGRAM FOR MALIGNANT NEOPLASM OF BREAST: ICD-10-CM

## 2024-08-23 LAB
ALBUMIN SERPL BCP-MCNC: 4.3 G/DL (ref 3.4–5)
ALP SERPL-CCNC: 71 U/L (ref 33–136)
ALT SERPL W P-5'-P-CCNC: 42 U/L (ref 7–45)
ANION GAP SERPL CALC-SCNC: 11 MMOL/L (ref 10–20)
AST SERPL W P-5'-P-CCNC: 28 U/L (ref 9–39)
BILIRUB SERPL-MCNC: 0.4 MG/DL (ref 0–1.2)
BUN SERPL-MCNC: 14 MG/DL (ref 6–23)
CALCIUM SERPL-MCNC: 9.5 MG/DL (ref 8.6–10.3)
CHLORIDE SERPL-SCNC: 103 MMOL/L (ref 98–107)
CHOLEST SERPL-MCNC: 234 MG/DL (ref 0–199)
CHOLESTEROL/HDL RATIO: 4.9
CO2 SERPL-SCNC: 29 MMOL/L (ref 21–32)
CREAT SERPL-MCNC: 0.7 MG/DL (ref 0.5–1.05)
EGFRCR SERPLBLD CKD-EPI 2021: >90 ML/MIN/1.73M*2
ERYTHROCYTE [DISTWIDTH] IN BLOOD BY AUTOMATED COUNT: 12 % (ref 11.5–14.5)
GLUCOSE SERPL-MCNC: 83 MG/DL (ref 74–99)
HCT VFR BLD AUTO: 41.2 % (ref 36–46)
HDLC SERPL-MCNC: 48.2 MG/DL
HGB BLD-MCNC: 13.3 G/DL (ref 12–16)
LDLC SERPL CALC-MCNC: 137 MG/DL
MCH RBC QN AUTO: 31.1 PG (ref 26–34)
MCHC RBC AUTO-ENTMCNC: 32.3 G/DL (ref 32–36)
MCV RBC AUTO: 97 FL (ref 80–100)
NON HDL CHOLESTEROL: 186 MG/DL (ref 0–149)
NRBC BLD-RTO: 0 /100 WBCS (ref 0–0)
PLATELET # BLD AUTO: 226 X10*3/UL (ref 150–450)
POTASSIUM SERPL-SCNC: 4.1 MMOL/L (ref 3.5–5.3)
PROT SERPL-MCNC: 7.4 G/DL (ref 6.4–8.2)
RBC # BLD AUTO: 4.27 X10*6/UL (ref 4–5.2)
SODIUM SERPL-SCNC: 139 MMOL/L (ref 136–145)
TRIGL SERPL-MCNC: 245 MG/DL (ref 0–149)
TSH SERPL-ACNC: 2.87 MIU/L (ref 0.44–3.98)
VLDL: 49 MG/DL (ref 0–40)
WBC # BLD AUTO: 4 X10*3/UL (ref 4.4–11.3)

## 2024-08-23 PROCEDURE — 3079F DIAST BP 80-89 MM HG: CPT | Performed by: FAMILY MEDICINE

## 2024-08-23 PROCEDURE — G0439 PPPS, SUBSEQ VISIT: HCPCS | Performed by: FAMILY MEDICINE

## 2024-08-23 PROCEDURE — 1159F MED LIST DOCD IN RCRD: CPT | Performed by: FAMILY MEDICINE

## 2024-08-23 PROCEDURE — 1170F FXNL STATUS ASSESSED: CPT | Performed by: FAMILY MEDICINE

## 2024-08-23 PROCEDURE — 36415 COLL VENOUS BLD VENIPUNCTURE: CPT

## 2024-08-23 PROCEDURE — 3074F SYST BP LT 130 MM HG: CPT | Performed by: FAMILY MEDICINE

## 2024-08-23 PROCEDURE — 3008F BODY MASS INDEX DOCD: CPT | Performed by: FAMILY MEDICINE

## 2024-08-23 PROCEDURE — 1160F RVW MEDS BY RX/DR IN RCRD: CPT | Performed by: FAMILY MEDICINE

## 2024-08-23 PROCEDURE — 1036F TOBACCO NON-USER: CPT | Performed by: FAMILY MEDICINE

## 2024-08-23 ASSESSMENT — ACTIVITIES OF DAILY LIVING (ADL)
TAKING_MEDICATION: INDEPENDENT
BATHING: INDEPENDENT
DRESSING: INDEPENDENT
DOING_HOUSEWORK: INDEPENDENT
GROCERY_SHOPPING: INDEPENDENT
MANAGING_FINANCES: INDEPENDENT

## 2024-08-23 ASSESSMENT — PATIENT HEALTH QUESTIONNAIRE - PHQ9
2. FEELING DOWN, DEPRESSED OR HOPELESS: NOT AT ALL
1. LITTLE INTEREST OR PLEASURE IN DOING THINGS: NOT AT ALL
2. FEELING DOWN, DEPRESSED OR HOPELESS: NOT AT ALL
SUM OF ALL RESPONSES TO PHQ9 QUESTIONS 1 AND 2: 0
1. LITTLE INTEREST OR PLEASURE IN DOING THINGS: NOT AT ALL
SUM OF ALL RESPONSES TO PHQ9 QUESTIONS 1 AND 2: 0

## 2024-08-23 NOTE — PROGRESS NOTES
"Subjective   Reason for Visit: Suzan Marie is an 68 y.o. female here for a Medicare Wellness visit.     Past Medical, Surgical, and Family History reviewed and updated in chart.    Reviewed all medications by prescribing practitioner or clinical pharmacist (such as prescriptions, OTCs, herbal therapies and supplements) and documented in the medical record.    HPI  Here for medicare annual visit, doing well, no issues    Patient Care Team:  Sridhar Kirby MD as PCP - General  Sridhar Kirby MD as PCP - [facility] Attributed Provider     Review of Systems  General: no fever  Eyes: no blurry vision  ENT: no sore throat, no ear pain  Resp: no cough, sob or wheezing  Cardio: no chest pain, no palpitations  Abd: no nausea/vomiting  : no dysuria, no increased urinary frequency    Objective   Vitals:  /84   Pulse 54   Temp 36.3 °C (97.3 °F)   Ht 1.6 m (5' 3\")   Wt 75.4 kg (166 lb 3.2 oz)   SpO2 97%   BMI 29.44 kg/m²       Physical Exam  Gen: NAD, alert  Head: normocephalic/atraumatic  Eyes: conjunctivae normal  Ears: canals clear bilaterally, TM normal   Nose: external nose normal   Oropharynx: clear   Resp: Clear to auscultation  CVS: Regular rate and rhythm  Abdomen: soft, NT, ND  Ext: no edema, NT of lower extremities  Neuro: gait normal     Assessment/Plan   Problem List Items Addressed This Visit             ICD-10-CM    Hyperlipidemia E78.5    Relevant Orders    Lipid Panel    TSH with reflex to Free T4 if abnormal    CBC    Comprehensive Metabolic Panel     Other Visit Diagnoses         Codes    Medicare annual wellness visit, subsequent    -  Primary Z00.00    BMI 29.0-29.9,adult     Z68.29    Encounter for screening mammogram for malignant neoplasm of breast     Z12.31    Relevant Orders    BI mammo bilateral screening tomosynthesis              "

## 2024-12-12 DIAGNOSIS — I48.0 PAROXYSMAL ATRIAL FIBRILLATION (MULTI): Primary | ICD-10-CM

## 2024-12-18 RX ORDER — RIVAROXABAN 20 MG/1
20 TABLET, FILM COATED ORAL NIGHTLY
Qty: 90 TABLET | Refills: 0 | Status: SHIPPED | OUTPATIENT
Start: 2024-12-18

## 2024-12-18 RX ORDER — RIVAROXABAN 20 MG/1
20 TABLET, FILM COATED ORAL NIGHTLY
Qty: 90 TABLET | Refills: 0 | Status: SHIPPED | OUTPATIENT
Start: 2024-12-18 | End: 2024-12-18 | Stop reason: SDUPTHER

## 2024-12-26 DIAGNOSIS — I48.0 PAROXYSMAL ATRIAL FIBRILLATION (MULTI): ICD-10-CM

## 2024-12-26 RX ORDER — METOPROLOL TARTRATE 100 MG/1
100 TABLET ORAL 2 TIMES DAILY
Qty: 180 TABLET | Refills: 3 | Status: SHIPPED | OUTPATIENT
Start: 2024-12-26

## 2025-01-07 DIAGNOSIS — K21.9 GASTROESOPHAGEAL REFLUX DISEASE WITHOUT ESOPHAGITIS: ICD-10-CM

## 2025-01-07 RX ORDER — OMEPRAZOLE 20 MG/1
20 CAPSULE, DELAYED RELEASE ORAL
Qty: 180 CAPSULE | Refills: 1 | Status: SHIPPED | OUTPATIENT
Start: 2025-01-07

## 2025-01-20 ENCOUNTER — APPOINTMENT (OUTPATIENT)
Dept: CARDIOLOGY | Facility: CLINIC | Age: 70
End: 2025-01-20
Payer: MEDICARE

## 2025-01-20 VITALS
WEIGHT: 158 LBS | SYSTOLIC BLOOD PRESSURE: 153 MMHG | HEIGHT: 63 IN | BODY MASS INDEX: 28 KG/M2 | DIASTOLIC BLOOD PRESSURE: 97 MMHG | OXYGEN SATURATION: 97 % | HEART RATE: 54 BPM

## 2025-01-20 DIAGNOSIS — I48.0 PAROXYSMAL ATRIAL FIBRILLATION (MULTI): Primary | ICD-10-CM

## 2025-01-20 DIAGNOSIS — I48.0 PAROXYSMAL ATRIAL FIBRILLATION (MULTI): ICD-10-CM

## 2025-01-20 DIAGNOSIS — I47.10 PAROXYSMAL SVT (SUPRAVENTRICULAR TACHYCARDIA) (CMS-HCC): Primary | ICD-10-CM

## 2025-01-20 PROCEDURE — 99215 OFFICE O/P EST HI 40 MIN: CPT | Performed by: INTERNAL MEDICINE

## 2025-01-20 PROCEDURE — 1159F MED LIST DOCD IN RCRD: CPT | Performed by: INTERNAL MEDICINE

## 2025-01-20 PROCEDURE — 3008F BODY MASS INDEX DOCD: CPT | Performed by: INTERNAL MEDICINE

## 2025-01-20 PROCEDURE — 3077F SYST BP >= 140 MM HG: CPT | Performed by: INTERNAL MEDICINE

## 2025-01-20 PROCEDURE — 3080F DIAST BP >= 90 MM HG: CPT | Performed by: INTERNAL MEDICINE

## 2025-01-20 PROCEDURE — 1036F TOBACCO NON-USER: CPT | Performed by: INTERNAL MEDICINE

## 2025-02-18 ENCOUNTER — APPOINTMENT (OUTPATIENT)
Dept: PRIMARY CARE | Facility: CLINIC | Age: 70
End: 2025-02-18
Payer: MEDICARE

## 2025-04-01 ENCOUNTER — TELEMEDICINE (OUTPATIENT)
Dept: PRIMARY CARE | Facility: CLINIC | Age: 70
End: 2025-04-01
Payer: MEDICARE

## 2025-04-01 DIAGNOSIS — J01.00 ACUTE NON-RECURRENT MAXILLARY SINUSITIS: Primary | ICD-10-CM

## 2025-04-01 PROCEDURE — 99213 OFFICE O/P EST LOW 20 MIN: CPT | Performed by: FAMILY MEDICINE

## 2025-04-01 RX ORDER — AMOXICILLIN AND CLAVULANATE POTASSIUM 875; 125 MG/1; MG/1
875 TABLET, FILM COATED ORAL 2 TIMES DAILY
Qty: 20 TABLET | Refills: 0 | Status: SHIPPED | OUTPATIENT
Start: 2025-04-01 | End: 2025-04-11

## 2025-04-01 NOTE — PROGRESS NOTES
Subjective   Patient ID: Suzan Marie is a 69 y.o. female who presents for sinus pressure and headaches  HPI  Virtual Visit    An interactive audio and video telecommunication system which permits real time communications between the patient (at the originating site) and provider (at the distant site) was utilized to provide this telehealth service.   Verbal consent was requested and obtained from Suzan Marie on this date, 04/01/25 for a telehealth visit and the patient's location was confirmed at the time of the visit.    Has been having sinus pressure and headaches x couple of weeks. No cough. No fever. Had an episode of nausea when she was in the car, no vomiting/diarrhea. Has been having right ear pressure as well, no discharge. No sob or wheezing. Been using loratadine and flonase, not helping.  Still has not gotten her mammogram done, reminded to get it done    Review of Systems  As per HPI    Vitals  due to telehealth visit, vitals not obtained, patient did not have them available at the time of the visit       Objective   Physical Exam  Physical exam done virtually through the computer screen     Gen: NAD  eyes: conjunctivae normal   Nose: external nose normal. Maxillary and frontal pressure when patient presses on her sinuses.   Resp: does not appear to be short of breath at present time, no audible wheezing    Assessment/Plan   Problem List Items Addressed This Visit    None  Visit Diagnoses       Acute non-recurrent maxillary sinusitis    -  Primary    Relevant Medications    amoxicillin-pot clavulanate (Augmentin) 875-125 mg tablet

## 2025-04-14 ENCOUNTER — OFFICE VISIT (OUTPATIENT)
Dept: PRIMARY CARE | Facility: CLINIC | Age: 70
End: 2025-04-14
Payer: MEDICARE

## 2025-04-14 VITALS
OXYGEN SATURATION: 98 % | DIASTOLIC BLOOD PRESSURE: 74 MMHG | HEART RATE: 61 BPM | SYSTOLIC BLOOD PRESSURE: 116 MMHG | TEMPERATURE: 97.2 F | WEIGHT: 171.4 LBS | BODY MASS INDEX: 30.37 KG/M2 | HEIGHT: 63 IN

## 2025-04-14 DIAGNOSIS — J30.89 SEASONAL ALLERGIC RHINITIS DUE TO OTHER ALLERGIC TRIGGER: ICD-10-CM

## 2025-04-14 DIAGNOSIS — B37.9 ANTIBIOTIC-INDUCED YEAST INFECTION: ICD-10-CM

## 2025-04-14 DIAGNOSIS — J32.9 RECURRENT SINUSITIS: Primary | ICD-10-CM

## 2025-04-14 DIAGNOSIS — T36.95XA ANTIBIOTIC-INDUCED YEAST INFECTION: ICD-10-CM

## 2025-04-14 DIAGNOSIS — J30.9 ALLERGIC RHINITIS, UNSPECIFIED SEASONALITY, UNSPECIFIED TRIGGER: ICD-10-CM

## 2025-04-14 PROCEDURE — 1159F MED LIST DOCD IN RCRD: CPT | Performed by: PHYSICIAN ASSISTANT

## 2025-04-14 PROCEDURE — 3074F SYST BP LT 130 MM HG: CPT | Performed by: PHYSICIAN ASSISTANT

## 2025-04-14 PROCEDURE — 3008F BODY MASS INDEX DOCD: CPT | Performed by: PHYSICIAN ASSISTANT

## 2025-04-14 PROCEDURE — 1036F TOBACCO NON-USER: CPT | Performed by: PHYSICIAN ASSISTANT

## 2025-04-14 PROCEDURE — 99213 OFFICE O/P EST LOW 20 MIN: CPT | Performed by: PHYSICIAN ASSISTANT

## 2025-04-14 PROCEDURE — 3078F DIAST BP <80 MM HG: CPT | Performed by: PHYSICIAN ASSISTANT

## 2025-04-14 PROCEDURE — 1160F RVW MEDS BY RX/DR IN RCRD: CPT | Performed by: PHYSICIAN ASSISTANT

## 2025-04-14 RX ORDER — PREDNISONE 20 MG/1
40 TABLET ORAL DAILY
Qty: 10 TABLET | Refills: 0 | Status: SHIPPED | OUTPATIENT
Start: 2025-04-14 | End: 2025-04-19

## 2025-04-14 RX ORDER — LORATADINE 10 MG/1
10 TABLET ORAL DAILY
Qty: 90 TABLET | Refills: 1 | Status: SHIPPED | OUTPATIENT
Start: 2025-04-14

## 2025-04-14 RX ORDER — FLUCONAZOLE 150 MG/1
150 TABLET ORAL
Qty: 1 TABLET | Refills: 0 | Status: SHIPPED | OUTPATIENT
Start: 2025-04-20 | End: 2025-04-14 | Stop reason: SDUPTHER

## 2025-04-14 RX ORDER — FLUCONAZOLE 150 MG/1
150 TABLET ORAL
Qty: 1 TABLET | Refills: 0 | Status: SHIPPED | OUTPATIENT
Start: 2025-04-14

## 2025-04-14 RX ORDER — AZITHROMYCIN 250 MG/1
TABLET, FILM COATED ORAL
Qty: 6 TABLET | Refills: 0 | Status: SHIPPED | OUTPATIENT
Start: 2025-04-14 | End: 2025-04-19

## 2025-04-14 ASSESSMENT — PATIENT HEALTH QUESTIONNAIRE - PHQ9
1. LITTLE INTEREST OR PLEASURE IN DOING THINGS: NOT AT ALL
2. FEELING DOWN, DEPRESSED OR HOPELESS: NOT AT ALL
SUM OF ALL RESPONSES TO PHQ9 QUESTIONS 1 AND 2: 0

## 2025-04-14 NOTE — PROGRESS NOTES
Subjective     HPI   Suzan Marie is a 69 y.o. year old female patient with presenting to clinic with concern for   Chief Complaint   Patient presents with    Sinusitis     Finished a round of abx symptoms still lingering. Done with abx last Friday.        Suzan is an established patient of Dr Carreon presenting with concern for sinus pressure. Ongoing issues after finishing augmentin 3 days ago. Sinus pressure, earache and pressure, dizziness episodes. Had significant vertigo in the car a few days ago. Fatigue.   Pressure worse when leaning forward. Cough worsens while supine.  No fevers.  Mild sore throat with postnasal drip.  Yellow mucus production.  Admits nasal congestion, ST, cough.  Denies chest congestion, malaise, myalgia, change in taste/smell, fever, SOB, diarrhea, N/V.     Has been on loratadine for years. Doesn't feel like it's working well anymore. Bringing up quite a bit of mucus.    She is also anxious about her xarelto being changed to a generic, but hasn't noticed any ill effects related to this. Reassurance provided.    Patient Active Problem List   Diagnosis    Allergic rhinitis    Anxiety    Dry eyes    Esophageal reflux    Hyperlipidemia    Hypertension    Lumbar radiculopathy    Panic attacks    Paroxysmal atrial fibrillation (Multi)    Chronic low back pain without sciatica    Chronic hip pain, right    Acute pain of right shoulder       Past Medical History:   Diagnosis Date    Abnormal screening mammogram 08/09/2023    Age-related nuclear cataract, left eye 01/22/2015    Age-related nuclear cataract of left eye    Age-related nuclear cataract, right eye 01/22/2015    Age-related nuclear cataract of right eye    Bilateral corneal scars 08/09/2023    Blepharitis, both eyes 08/09/2023    Chorioretinal scar of right eye 08/09/2023    Corneal injury due to contact lens 08/09/2023    Dry eye syndrome of left lacrimal gland 01/27/2015    Dry eye syndrome of left lacrimal gland    Dry eye  syndrome of right lacrimal gland 01/22/2015    Dry eye syndrome of right lacrimal gland    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    Fracture of unspecified metatarsal bone(s), right foot, initial encounter for closed fracture 06/29/2016    Avulsion fracture of metatarsal bone of right foot    Hypertension     Medial epicondylitis of right elbow 08/09/2023    Nuclear sclerotic cataract of both eyes 08/09/2023    Osteoarthritis of hip 08/09/2023    Plantar fasciitis of left foot 08/09/2023    Sjogren syndrome, unspecified (Multi) 02/11/2020    Sicca    Status post LASIK surgery 08/09/2023    Vertigo 08/09/2023      Past Surgical History:   Procedure Laterality Date    CT ANGIO NECK  6/7/2022    CT NECK ANGIO W AND WO IV CONTRAST 6/7/2022 [facility]    HEMORRHOID SURGERY  09/14/2012    Hemorrhoidectomy    HYSTERECTOMY  09/14/2012    Hysterectomy    REFRACTIVE SURGERY  01/22/2015    Corneal LASIK    SHOULDER SURGERY  07/13/2017    Shoulder Surgery      Family History   Problem Relation Name Age of Onset    Atrial fibrillation Mother      Cataracts Mother      Glaucoma Mother      Uterine cancer Mother      Diabetes Father      Cataracts Father      Coronary artery disease Father      Glaucoma Father      Hypertension Father      Lymphoma Brother      Congenital heart disease Son      Diabetes Paternal Grandmother      Sjogren's syndrome Mother's Sister      Sudden death Other maternal uncle     Glaucoma Other paternal aunt     Sjogren's syndrome Cousin maternal       Social History     Tobacco Use    Smoking status: Former     Current packs/day: 2.50     Average packs/day: 2.5 packs/day for 35.0 years (87.5 ttl pk-yrs)     Types: Cigarettes    Smokeless tobacco: Never   Substance Use Topics    Alcohol use: Yes     Comment: occasional        Current Outpatient Medications:     cycloSPORINE (Restasis) 0.05 % ophthalmic emulsion, INSTILL 1 DROP INTO EACH EYE TWICE DAILY, Disp: , Rfl:     metoprolol tartrate (Lopressor)  "100 mg tablet, Take 1 tablet (100 mg) by mouth 2 times a day., Disp: 180 tablet, Rfl: 3    multivitamin tablet, Take by mouth., Disp: , Rfl:     omeprazole (PriLOSEC) 20 mg DR capsule, Take 1 capsule (20 mg) by mouth 2 times a day before meals., Disp: 180 capsule, Rfl: 1    RED YEAST RICE ORAL, Take by mouth., Disp: , Rfl:     rivaroxaban (Xarelto) 20 mg tablet, Take 1 tablet (20 mg) by mouth once daily. Take with food., Disp: 90 tablet, Rfl: 3    tiZANidine (Zanaflex) 2 mg tablet, Take 1 tablet (2 mg) by mouth as needed at bedtime (back pain)., Disp: 30 tablet, Rfl: 3    azithromycin (Zithromax) 250 mg tablet, Take 2 tablets (500 mg) by mouth once daily for 1 day, THEN 1 tablet (250 mg) once daily for 4 days. Take 2 tabs (500 mg) by mouth today, than 1 daily for 4 days.., Disp: 6 tablet, Rfl: 0    fluconazole (Diflucan) 150 mg tablet, Take 1 tablet (150 mg) by mouth 1 (one) time per week., Disp: 1 tablet, Rfl: 0    loratadine (Claritin) 10 mg tablet, Take 1 tablet (10 mg) by mouth once daily., Disp: 90 tablet, Rfl: 1    predniSONE (Deltasone) 20 mg tablet, Take 2 tablets (40 mg) by mouth once daily for 5 days., Disp: 10 tablet, Rfl: 0     Review of Systems  Constitutional: Denies fever  HEENT: Denies ST, earache  CVS: Denies Chest pain  Pulmonary: Denies wheezing, SOB  GI: Denies N/V  : Denies dysuria  Musculoskeletal:  Denies myalgia  Neuro: Denies focal weakness or numbness.  Skin: Denies Rashes.  *Review of Systems is negative unless otherwise mentioned in HPI or ROS above.    Objective   /74   Pulse 61   Temp 36.2 °C (97.2 °F)   Ht 1.6 m (5' 3\")   Wt 77.7 kg (171 lb 6.4 oz)   SpO2 98%   BMI 30.36 kg/m²  reviewed Body mass index is 30.36 kg/m².     Physical Exam  General: Vitals noted, no distress. Afebrile. Resting comfortably  EENT:  Moist oral mucosa. Posterior oropharynx erythematous with yellow posterior pharyngeal streaking.  Nasal mucosa erythematous . Tenderness over maxillary sinuses and " ethmoid area. R TM nonerythematous, mildly buldging TM; Left TM wnl. Mildly tender occipital lymphnodes.  Cardiac: Regular rate & rhythm. No murmur.   Pulmonary: Lungs clear bilaterally with good aeration. No wheezes, rhonchi, or rales.  Abdomen: Soft. Nontender, Nondistended. Normal bowel sounds x4.  Extremities: No peripheral edema.  Neck is supple.   Skin: No rash or evidence of trauma.  Neuro: No focal neurologic deficits.    .Assessment/Plan   Problem List Items Addressed This Visit             ICD-10-CM    Allergic rhinitis J30.9    Relevant Medications    loratadine (Claritin) 10 mg tablet     Other Visit Diagnoses         Codes    Recurrent sinusitis    -  Primary J32.9    Relevant Medications    predniSONE (Deltasone) 20 mg tablet    azithromycin (Zithromax) 250 mg tablet    Antibiotic-induced yeast infection     B37.9, T36.95XA    Relevant Medications    fluconazole (Diflucan) 150 mg tablet

## 2025-04-21 ENCOUNTER — TELEPHONE (OUTPATIENT)
Dept: PRIMARY CARE | Facility: CLINIC | Age: 70
End: 2025-04-21
Payer: MEDICARE

## 2025-04-21 NOTE — TELEPHONE ENCOUNTER
"Caller: Meme    Concern/Symptoms: Still experiencing headache, runny nose, ear fullness, sinus pressure, and fatigue.  \"Burning in nose and ears still with achy eyes.\" Got done with antibiotics and steroids on Friday.     OTC meds tried for this: none    Seen at Urgent care or ER for this? No    Pharmacy? [facility] in [place]    Do you feel like you need to go to the ER? No      "

## 2025-04-24 ENCOUNTER — OFFICE VISIT (OUTPATIENT)
Dept: PRIMARY CARE | Facility: CLINIC | Age: 70
End: 2025-04-24
Payer: MEDICARE

## 2025-04-24 VITALS
BODY MASS INDEX: 30.19 KG/M2 | HEIGHT: 63 IN | OXYGEN SATURATION: 99 % | WEIGHT: 170.4 LBS | HEART RATE: 71 BPM | TEMPERATURE: 96 F | SYSTOLIC BLOOD PRESSURE: 120 MMHG | DIASTOLIC BLOOD PRESSURE: 68 MMHG

## 2025-04-24 DIAGNOSIS — Z12.11 SCREENING FOR COLON CANCER: ICD-10-CM

## 2025-04-24 DIAGNOSIS — J30.89 SEASONAL ALLERGIC RHINITIS DUE TO OTHER ALLERGIC TRIGGER: Primary | ICD-10-CM

## 2025-04-24 PROCEDURE — 3008F BODY MASS INDEX DOCD: CPT | Performed by: FAMILY MEDICINE

## 2025-04-24 PROCEDURE — 99213 OFFICE O/P EST LOW 20 MIN: CPT | Performed by: FAMILY MEDICINE

## 2025-04-24 PROCEDURE — 1159F MED LIST DOCD IN RCRD: CPT | Performed by: FAMILY MEDICINE

## 2025-04-24 PROCEDURE — 3078F DIAST BP <80 MM HG: CPT | Performed by: FAMILY MEDICINE

## 2025-04-24 PROCEDURE — 3074F SYST BP LT 130 MM HG: CPT | Performed by: FAMILY MEDICINE

## 2025-04-24 RX ORDER — FLUTICASONE PROPIONATE 50 MCG
1 SPRAY, SUSPENSION (ML) NASAL DAILY
Qty: 16 G | Refills: 5 | Status: SHIPPED | OUTPATIENT
Start: 2025-04-24 | End: 2026-04-24

## 2025-04-24 ASSESSMENT — PATIENT HEALTH QUESTIONNAIRE - PHQ9
1. LITTLE INTEREST OR PLEASURE IN DOING THINGS: NOT AT ALL
SUM OF ALL RESPONSES TO PHQ9 QUESTIONS 1 AND 2: 0
2. FEELING DOWN, DEPRESSED OR HOPELESS: NOT AT ALL

## 2025-04-24 NOTE — PROGRESS NOTES
"Subjective   Patient ID: Suzan Marie \"Meme\" is a 69 y.o. female who presents for Sinusitis (Sinus pressure, sinus pain, headache, feels like ears are blocked. Has had abx twice. ).  HPI  Been on augmentin, azithromycin, prednisone    Review of Systems      /68   Pulse 71   Temp 35.6 °C (96 °F)   Ht 1.6 m (5' 3\")   Wt 77.3 kg (170 lb 6.4 oz)   SpO2 99%   BMI 30.19 kg/m²       Objective   Physical Exam    Assessment/Plan   Problem List Items Addressed This Visit    None         " for colon cancer        Relevant Orders    Colonoscopy Screening; Average Risk Patient

## 2025-07-02 ENCOUNTER — OFFICE VISIT (OUTPATIENT)
Dept: CARDIOLOGY | Facility: CLINIC | Age: 70
End: 2025-07-02
Payer: MEDICARE

## 2025-07-02 VITALS
HEIGHT: 63 IN | SYSTOLIC BLOOD PRESSURE: 117 MMHG | DIASTOLIC BLOOD PRESSURE: 76 MMHG | WEIGHT: 165 LBS | OXYGEN SATURATION: 93 % | BODY MASS INDEX: 29.23 KG/M2 | HEART RATE: 62 BPM

## 2025-07-02 DIAGNOSIS — R60.0 LOWER EXTREMITY EDEMA: Primary | ICD-10-CM

## 2025-07-02 DIAGNOSIS — R06.02 SHORTNESS OF BREATH: ICD-10-CM

## 2025-07-02 PROCEDURE — 3008F BODY MASS INDEX DOCD: CPT | Performed by: PHYSICIAN ASSISTANT

## 2025-07-02 PROCEDURE — 1036F TOBACCO NON-USER: CPT | Performed by: PHYSICIAN ASSISTANT

## 2025-07-02 PROCEDURE — 99214 OFFICE O/P EST MOD 30 MIN: CPT | Performed by: PHYSICIAN ASSISTANT

## 2025-07-02 PROCEDURE — 3078F DIAST BP <80 MM HG: CPT | Performed by: PHYSICIAN ASSISTANT

## 2025-07-02 PROCEDURE — 3074F SYST BP LT 130 MM HG: CPT | Performed by: PHYSICIAN ASSISTANT

## 2025-07-02 PROCEDURE — 1159F MED LIST DOCD IN RCRD: CPT | Performed by: PHYSICIAN ASSISTANT

## 2025-07-02 RX ORDER — FUROSEMIDE 20 MG/1
20 TABLET ORAL DAILY
Qty: 30 TABLET | Refills: 11 | Status: SHIPPED | OUTPATIENT
Start: 2025-07-02 | End: 2026-07-02

## 2025-07-02 NOTE — PROGRESS NOTES
"Chief Complaint:   Valve Disorder (Here for bilateral leg edema, weight increase, pain in legs and feet  abdominal bloating, severe fatigue and also/Muscle aches)     History Of Present Illness:    Meme Marie is a 69 y.o. female presenting with chief complaint of bilateral lower extremity edema, worse at the end of the day, fatigue, abdominal fullness.  Reports that the lower extremity edema is improved with ice, compression and elevation.  Has no exacerbating or alleviating factors related to the abdominal fullness and fatigue.  Denies chest pain, chest pressure, palpitations, shortness of breath, dyspnea on exertion, orthopnea.  No recent medication changes aside from that she has no getting her Xarelto from [facility].     Last Recorded Vitals:  Vitals:    07/02/25 1506   BP: 117/76   Pulse: 62   SpO2: 93%   Weight: 74.8 kg (165 lb)   Height: 1.6 m (5' 3\")       Past Medical History:  She has a past medical history of Abnormal screening mammogram (08/09/2023), Age-related nuclear cataract, left eye (01/22/2015), Age-related nuclear cataract, right eye (01/22/2015), Bilateral corneal scars (08/09/2023), Blepharitis, both eyes (08/09/2023), Chorioretinal scar of right eye (08/09/2023), Corneal injury due to contact lens (08/09/2023), Dry eye syndrome of left lacrimal gland (01/27/2015), Dry eye syndrome of right lacrimal gland (01/22/2015), 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    Past Surgical History:  She has a past surgical history that includes Hysterectomy (09/14/2012); Hemorrhoid surgery " (09/14/2012); Refractive surgery (01/22/2015); Shoulder surgery (07/13/2017); and CT angio neck (6/7/2022).      Social History:  She reports that she has quit smoking. Her smoking use included cigarettes. She has a 87.5 pack-year smoking history. She has never used smokeless tobacco. She reports current alcohol use. She reports that she does not use drugs.    Family History:  Family History[1]     Allergies:  Patient has no known allergies.    Outpatient Medications:  Current Outpatient Medications   Medication Instructions    cycloSPORINE (Restasis) 0.05 % ophthalmic emulsion INSTILL 1 DROP INTO EACH EYE TWICE DAILY    fluconazole (DIFLUCAN) 150 mg, oral, Once Weekly    fluticasone (Flonase) 50 mcg/actuation nasal spray 1 spray, Each Nostril, Daily, Shake gently. Before first use, prime pump. After use, clean tip and replace cap.    furosemide (LASIX) 20 mg, oral, Daily    loratadine (CLARITIN) 10 mg, oral, Daily    metoprolol tartrate (LOPRESSOR) 100 mg, oral, 2 times daily    multivitamin tablet Take by mouth.    omeprazole (PRILOSEC) 20 mg, oral, 2 times daily before meals    RED YEAST RICE ORAL Take by mouth.    rivaroxaban (XARELTO) 20 mg, oral, Daily, Take with food.    tiZANidine (ZANAFLEX) 2 mg, oral, Nightly PRN       Physical Exam:  Physical Exam  Constitutional:       General: She is not in acute distress.  HENT:      Head: Normocephalic.      Nose: Nose normal.      Mouth/Throat:      Mouth: Mucous membranes are moist.   Eyes:      Conjunctiva/sclera: Conjunctivae normal.   Neck:      Comments: No JVD.  Cardiovascular:      Rate and Rhythm: Normal rate and regular rhythm.      Pulses: Normal pulses.      Heart sounds: Normal heart sounds. No murmur heard.  Pulmonary:      Effort: Pulmonary effort is normal. No respiratory distress.      Breath sounds: Normal breath sounds.   Abdominal:      Palpations: Abdomen is soft.   Musculoskeletal:         General: No swelling.      Cervical back: Neck supple.       Comments: Pitting edema to the bilateral lower extremities, trace.  Superficial varicosities bilaterally more prominent on the posterior legs at the level of the calf.   Skin:     General: Skin is warm.   Neurological:      General: No focal deficit present.      Mental Status: She is alert. Mental status is at baseline.   Psychiatric:         Mood and Affect: Mood normal.         Behavior: Behavior normal.            Last Labs:  CBC -  Lab Results   Component Value Date    WBC 4.0 (L) 08/23/2024    HGB 13.3 08/23/2024    HCT 41.2 08/23/2024    MCV 97 08/23/2024     08/23/2024       CMP -  Lab Results   Component Value Date    CALCIUM 9.5 08/23/2024    PROT 7.4 08/23/2024    ALBUMIN 4.3 08/23/2024    AST 28 08/23/2024    ALT 42 08/23/2024    ALKPHOS 71 08/23/2024    BILITOT 0.4 08/23/2024       LIPID PANEL -   Lab Results   Component Value Date    CHOL 234 (H) 08/23/2024    TRIG 245 (H) 08/23/2024    HDL 48.2 08/23/2024    CHHDL 4.9 08/23/2024    LDLF 148 (H) 08/10/2023    VLDL 49 (H) 08/23/2024    NHDL 186 (H) 08/23/2024       RENAL FUNCTION PANEL -   Lab Results   Component Value Date    GLUCOSE 83 08/23/2024     08/23/2024    K 4.1 08/23/2024     08/23/2024    CO2 29 08/23/2024    ANIONGAP 11 08/23/2024    BUN 14 08/23/2024    CREATININE 0.70 08/23/2024    CALCIUM 9.5 08/23/2024    ALBUMIN 4.3 08/23/2024        Lab Results   Component Value Date     (H) 06/07/2022    HGBA1C 5.2 11/02/2020       Last Cardiology Tests:  ECG:  EKG 6/27/2022 independently reviewed, normal sinus rhythm no acute ischemic changes.    Echo:  Transthoracic echocardiogram (4/15/2022)  CONCLUSIONS:   1. The left ventricular systolic function is normal with a 65-70% estimated ejection fraction.    Transthoracic echocardiogram (8/2/2018)  CONCLUSIONS:   1. The left ventricular systolic function is normal with a 60-65% estimated ejection fraction.   2. Spectral Doppler shows an impaired relaxation pattern of left  "ventricular diastolic filling.     Transthoracic echocardiogram (1/6/2016)  CONCLUSIONS:   1. The left ventricular systolic function is normal with a 60-65% ejection fraction.   2. There are no significant valvular abnormalities    Ejection Fractions:  No results found for: \"EF\"    Cath:  No results found for this or any previous visit from the past 1095 days.      Stress Test:  No results found for this or any previous visit from the past 1095 days.      Cardiac Imaging:  CT heart calcium scoring wo IV contrast 08/11/2022  LM 0  .36  LCx 0  RCA 0     Total 231.36      Lab review: I have Chemistry BMP   Lab Results   Component Value Date    GLUCOSE 83 08/23/2024    CALCIUM 9.5 08/23/2024    CO2 29 08/23/2024    CREATININE 0.70 08/23/2024   , CBC:  Lab Results   Component Value Date    WBC 4.0 (L) 08/23/2024    RBC 4.27 08/23/2024    HGB 13.3 08/23/2024    HCT 41.2 08/23/2024    MCV 97 08/23/2024    MCH 31.1 08/23/2024    MCHC 32.3 08/23/2024    RDW 12.0 08/23/2024    NRBC 0.0 08/23/2024   , Coags:   Lab Results   Component Value Date    INR 1.1 06/07/2022   , and Lipids:   Lab Results   Component Value Date    CHOL 234 (H) 08/23/2024    HDL 48.2 08/23/2024    LDLCALC 137 (H) 08/23/2024    TRIG 245 (H) 08/23/2024     Diagnostic review: I have personally reviewed the result(s) of the EKG and Echocardiogram .   Imaging review: I have  personally reviewed the result(s) CT calcium score.    Assessment/Plan   Problem List Items Addressed This Visit    None  Visit Diagnoses         Codes      Lower extremity edema    -  Primary R60.0    Relevant Medications    furosemide (Lasix) 20 mg tablet    Other Relevant Orders    Vascular US lower extremity venous insufficiency bilateral    Basic metabolic panel      Shortness of breath     R06.02    Relevant Medications    furosemide (Lasix) 20 mg tablet    Other Relevant Orders    Transthoracic echo (TTE) complete    Basic metabolic panel          Suzan Marie is a " 69-year-old female with past medical history of paroxysmal A-fib (on Xarelto), hypertension, hyperlipidemia GERD, anxiety who presents for follow-up with a chief complaint of lower extremity edema, fatigue.    1.  Bilateral lower extremity edema with superficial varicosities  2.  Paroxysmal atrial fibrillation  3.  Hypertension    Recommend venous insufficiency study for further evaluation of deep venous reflux in patient with lower extremity dependent edema and superficial varicosities.  Recommend repeat echocardiogram for structural and functional evaluation.  Start Lasix 20 mg daily, BMP in 2 weeks.  Continue with home Xarelto for primary fraction of stroke in the setting of atrial fibrillation.  Continue with metoprolol tartrate 100 mg twice daily.  Return to care posttesting.      Clair Medina PA-C       [1]   Family History  Problem Relation Name Age of Onset    Atrial fibrillation Mother      Cataracts Mother      Glaucoma Mother      Uterine cancer Mother      Diabetes Father      Cataracts Father      Coronary artery disease Father      Glaucoma Father      Hypertension Father      Lymphoma Brother      Congenital heart disease Son      Diabetes Paternal Grandmother      Sjogren's syndrome Mother's Sister      Sudden death Other maternal uncle     Glaucoma Other paternal aunt     Sjogren's syndrome Cousin maternal

## 2025-07-08 ENCOUNTER — HOSPITAL ENCOUNTER (OUTPATIENT)
Dept: VASCULAR MEDICINE | Facility: HOSPITAL | Age: 70
Discharge: HOME | End: 2025-07-08
Payer: MEDICARE

## 2025-07-08 DIAGNOSIS — R60.0 LOWER EXTREMITY EDEMA: ICD-10-CM

## 2025-07-08 PROCEDURE — 93970 EXTREMITY STUDY: CPT

## 2025-07-08 PROCEDURE — 93970 EXTREMITY STUDY: CPT | Performed by: SURGERY

## 2025-07-11 ENCOUNTER — HOSPITAL ENCOUNTER (OUTPATIENT)
Dept: CARDIOLOGY | Facility: HOSPITAL | Age: 70
Discharge: HOME | End: 2025-07-11
Payer: MEDICARE

## 2025-07-11 DIAGNOSIS — R06.02 SHORTNESS OF BREATH: ICD-10-CM

## 2025-07-11 LAB
AORTIC VALVE PEAK VELOCITY: 1.02 M/S
AV PEAK GRADIENT: 4 MMHG
AVA (PEAK VEL): 2.48 CM2
EJECTION FRACTION APICAL 4 CHAMBER: 66.1
EJECTION FRACTION: 63 %
LEFT ATRIUM VOLUME AREA LENGTH INDEX BSA: 17.9 ML/M2
LEFT VENTRICLE INTERNAL DIMENSION DIASTOLE: 3.6 CM (ref 3.5–6)
LEFT VENTRICULAR OUTFLOW TRACT DIAMETER: 2.01 CM
MITRAL VALVE E/A RATIO: 0.72
MITRAL VALVE E/E' RATIO: 7.41
RIGHT VENTRICLE FREE WALL PEAK S': 11.96 CM/S
TRICUSPID ANNULAR PLANE SYSTOLIC EXCURSION: 1.8 CM

## 2025-07-11 PROCEDURE — 93306 TTE W/DOPPLER COMPLETE: CPT

## 2025-07-11 PROCEDURE — 93306 TTE W/DOPPLER COMPLETE: CPT | Performed by: INTERNAL MEDICINE

## 2025-07-16 LAB
ANION GAP SERPL CALCULATED.4IONS-SCNC: 9 MMOL/L (CALC) (ref 7–17)
BUN SERPL-MCNC: 15 MG/DL (ref 7–25)
BUN/CREAT SERPL: ABNORMAL (CALC) (ref 6–22)
CALCIUM SERPL-MCNC: 9.3 MG/DL (ref 8.6–10.4)
CHLORIDE SERPL-SCNC: 104 MMOL/L (ref 98–110)
CO2 SERPL-SCNC: 28 MMOL/L (ref 20–32)
CREAT SERPL-MCNC: 0.75 MG/DL (ref 0.6–1)
EGFRCR SERPLBLD CKD-EPI 2021: 85 ML/MIN/1.73M2
GLUCOSE SERPL-MCNC: 108 MG/DL (ref 65–99)
POTASSIUM SERPL-SCNC: 4 MMOL/L (ref 3.5–5.3)
SODIUM SERPL-SCNC: 141 MMOL/L (ref 135–146)

## 2025-07-21 ENCOUNTER — APPOINTMENT (OUTPATIENT)
Dept: CARDIOLOGY | Facility: CLINIC | Age: 70
End: 2025-07-21
Payer: MEDICARE

## 2025-08-19 DIAGNOSIS — Z12.31 ENCOUNTER FOR SCREENING MAMMOGRAM FOR BREAST CANCER: ICD-10-CM

## 2025-08-20 ENCOUNTER — APPOINTMENT (OUTPATIENT)
Dept: CARDIOLOGY | Facility: CLINIC | Age: 70
End: 2025-08-20
Payer: MEDICARE

## 2025-08-21 ENCOUNTER — HOSPITAL ENCOUNTER (OUTPATIENT)
Dept: CARDIOLOGY | Facility: HOSPITAL | Age: 70
Discharge: HOME | End: 2025-08-21
Payer: MEDICARE

## 2025-08-21 ENCOUNTER — APPOINTMENT (OUTPATIENT)
Dept: PRIMARY CARE | Facility: CLINIC | Age: 70
End: 2025-08-21
Payer: MEDICARE

## 2025-08-21 VITALS
HEART RATE: 49 BPM | HEIGHT: 63 IN | DIASTOLIC BLOOD PRESSURE: 74 MMHG | TEMPERATURE: 97.4 F | SYSTOLIC BLOOD PRESSURE: 110 MMHG | BODY MASS INDEX: 28.99 KG/M2 | WEIGHT: 163.6 LBS | OXYGEN SATURATION: 98 %

## 2025-08-21 DIAGNOSIS — E78.2 MIXED HYPERLIPIDEMIA: ICD-10-CM

## 2025-08-21 DIAGNOSIS — I48.0 PAROXYSMAL ATRIAL FIBRILLATION (MULTI): ICD-10-CM

## 2025-08-21 DIAGNOSIS — Z78.0 ASYMPTOMATIC POSTMENOPAUSAL STATE: ICD-10-CM

## 2025-08-21 DIAGNOSIS — I50.32 CHRONIC DIASTOLIC HEART FAILURE: ICD-10-CM

## 2025-08-21 DIAGNOSIS — Z00.00 MEDICARE ANNUAL WELLNESS VISIT, SUBSEQUENT: Primary | ICD-10-CM

## 2025-08-21 DIAGNOSIS — R00.1 SINUS BRADYCARDIA: ICD-10-CM

## 2025-08-21 DIAGNOSIS — Z12.11 SPECIAL SCREENING FOR MALIGNANT NEOPLASM OF COLON: ICD-10-CM

## 2025-08-21 PROBLEM — M25.511 ACUTE PAIN OF RIGHT SHOULDER: Status: RESOLVED | Noted: 2024-03-08 | Resolved: 2025-08-21

## 2025-08-21 LAB
ATRIAL RATE: 54 BPM
P AXIS: 35 DEGREES
P OFFSET: 202 MS
P ONSET: 140 MS
PR INTERVAL: 164 MS
Q ONSET: 222 MS
QRS COUNT: 8 BEATS
QRS DURATION: 84 MS
QT INTERVAL: 464 MS
QTC CALCULATION(BAZETT): 440 MS
QTC FREDERICIA: 447 MS
R AXIS: -27 DEGREES
T AXIS: 12 DEGREES
T OFFSET: 454 MS
VENTRICULAR RATE: 54 BPM

## 2025-08-21 PROCEDURE — 3074F SYST BP LT 130 MM HG: CPT | Performed by: FAMILY MEDICINE

## 2025-08-21 PROCEDURE — 1170F FXNL STATUS ASSESSED: CPT | Performed by: FAMILY MEDICINE

## 2025-08-21 PROCEDURE — G0439 PPPS, SUBSEQ VISIT: HCPCS | Performed by: FAMILY MEDICINE

## 2025-08-21 PROCEDURE — 3078F DIAST BP <80 MM HG: CPT | Performed by: FAMILY MEDICINE

## 2025-08-21 PROCEDURE — 1159F MED LIST DOCD IN RCRD: CPT | Performed by: FAMILY MEDICINE

## 2025-08-21 PROCEDURE — 93005 ELECTROCARDIOGRAM TRACING: CPT

## 2025-08-21 PROCEDURE — 3008F BODY MASS INDEX DOCD: CPT | Performed by: FAMILY MEDICINE

## 2025-08-21 ASSESSMENT — ACTIVITIES OF DAILY LIVING (ADL)
DOING_HOUSEWORK: INDEPENDENT
DRESSING: INDEPENDENT
GROCERY_SHOPPING: INDEPENDENT
MANAGING_FINANCES: INDEPENDENT
BATHING: INDEPENDENT
TAKING_MEDICATION: INDEPENDENT

## 2025-08-21 ASSESSMENT — ENCOUNTER SYMPTOMS
LOSS OF SENSATION IN FEET: 0
OCCASIONAL FEELINGS OF UNSTEADINESS: 0
DEPRESSION: 0

## 2025-08-22 ENCOUNTER — APPOINTMENT (OUTPATIENT)
Dept: RADIOLOGY | Facility: HOSPITAL | Age: 70
End: 2025-08-22
Payer: MEDICARE

## 2025-08-22 ENCOUNTER — APPOINTMENT (OUTPATIENT)
Dept: PRIMARY CARE | Facility: CLINIC | Age: 70
End: 2025-08-22
Payer: MEDICARE

## 2025-08-22 ENCOUNTER — HOSPITAL ENCOUNTER (OUTPATIENT)
Dept: RADIOLOGY | Facility: HOSPITAL | Age: 70
End: 2025-08-22
Payer: MEDICARE

## 2025-08-22 LAB
ALBUMIN SERPL-MCNC: 4.5 G/DL (ref 3.6–5.1)
ALP SERPL-CCNC: 71 U/L (ref 37–153)
ALT SERPL-CCNC: 17 U/L (ref 6–29)
ANION GAP SERPL CALCULATED.4IONS-SCNC: 9 MMOL/L (CALC) (ref 7–17)
AST SERPL-CCNC: 18 U/L (ref 10–35)
BILIRUB SERPL-MCNC: 0.5 MG/DL (ref 0.2–1.2)
BUN SERPL-MCNC: 14 MG/DL (ref 7–25)
CALCIUM SERPL-MCNC: 9.6 MG/DL (ref 8.6–10.4)
CHLORIDE SERPL-SCNC: 103 MMOL/L (ref 98–110)
CHOLEST SERPL-MCNC: 220 MG/DL
CHOLEST/HDLC SERPL: 3.9 (CALC)
CO2 SERPL-SCNC: 28 MMOL/L (ref 20–32)
CREAT SERPL-MCNC: 0.66 MG/DL (ref 0.6–1)
EGFRCR SERPLBLD CKD-EPI 2021: 94 ML/MIN/1.73M2
ERYTHROCYTE [DISTWIDTH] IN BLOOD BY AUTOMATED COUNT: 13.4 % (ref 11–15)
GLUCOSE SERPL-MCNC: 93 MG/DL (ref 65–99)
HCT VFR BLD AUTO: 41.6 % (ref 35–45)
HDLC SERPL-MCNC: 57 MG/DL
HGB BLD-MCNC: 13.7 G/DL (ref 11.7–15.5)
LDLC SERPL CALC-MCNC: 137 MG/DL (CALC)
MAGNESIUM SERPL-MCNC: 2.3 MG/DL (ref 1.5–2.5)
MCH RBC QN AUTO: 32.6 PG (ref 27–33)
MCHC RBC AUTO-ENTMCNC: 32.9 G/DL (ref 32–36)
MCV RBC AUTO: 99 FL (ref 80–100)
NONHDLC SERPL-MCNC: 163 MG/DL (CALC)
PLATELET # BLD AUTO: 215 THOUSAND/UL (ref 140–400)
PMV BLD REES-ECKER: 9.9 FL (ref 7.5–12.5)
POTASSIUM SERPL-SCNC: 4.5 MMOL/L (ref 3.5–5.3)
PROT SERPL-MCNC: 7 G/DL (ref 6.1–8.1)
RBC # BLD AUTO: 4.2 MILLION/UL (ref 3.8–5.1)
SODIUM SERPL-SCNC: 140 MMOL/L (ref 135–146)
TRIGL SERPL-MCNC: 133 MG/DL
TSH SERPL-ACNC: 1.37 MIU/L (ref 0.4–4.5)
WBC # BLD AUTO: 3.7 THOUSAND/UL (ref 3.8–10.8)

## 2025-08-23 ENCOUNTER — APPOINTMENT (OUTPATIENT)
Dept: RADIOLOGY | Facility: HOSPITAL | Age: 70
End: 2025-08-23
Payer: MEDICARE

## 2025-08-26 ENCOUNTER — APPOINTMENT (OUTPATIENT)
Dept: PRIMARY CARE | Facility: CLINIC | Age: 70
End: 2025-08-26
Payer: MEDICARE

## 2025-08-26 VITALS
SYSTOLIC BLOOD PRESSURE: 120 MMHG | DIASTOLIC BLOOD PRESSURE: 74 MMHG | WEIGHT: 164 LBS | OXYGEN SATURATION: 98 % | HEIGHT: 62 IN | TEMPERATURE: 95.5 F | BODY MASS INDEX: 30.18 KG/M2 | HEART RATE: 57 BPM

## 2025-08-26 DIAGNOSIS — R00.1 SINUS BRADYCARDIA: Primary | ICD-10-CM

## 2025-08-26 DIAGNOSIS — E78.2 MIXED HYPERLIPIDEMIA: ICD-10-CM

## 2025-08-26 PROCEDURE — 3074F SYST BP LT 130 MM HG: CPT | Performed by: FAMILY MEDICINE

## 2025-08-26 PROCEDURE — 3078F DIAST BP <80 MM HG: CPT | Performed by: FAMILY MEDICINE

## 2025-08-26 PROCEDURE — 1159F MED LIST DOCD IN RCRD: CPT | Performed by: FAMILY MEDICINE

## 2025-08-26 PROCEDURE — 99213 OFFICE O/P EST LOW 20 MIN: CPT | Performed by: FAMILY MEDICINE

## 2025-08-26 PROCEDURE — 3008F BODY MASS INDEX DOCD: CPT | Performed by: FAMILY MEDICINE

## 2025-08-26 ASSESSMENT — PATIENT HEALTH QUESTIONNAIRE - PHQ9
2. FEELING DOWN, DEPRESSED OR HOPELESS: NOT AT ALL
SUM OF ALL RESPONSES TO PHQ9 QUESTIONS 1 AND 2: 0
1. LITTLE INTEREST OR PLEASURE IN DOING THINGS: NOT AT ALL

## 2025-09-02 ENCOUNTER — HOSPITAL ENCOUNTER (OUTPATIENT)
Dept: RADIOLOGY | Facility: HOSPITAL | Age: 70
Discharge: HOME | End: 2025-09-02
Payer: MEDICARE

## 2025-09-02 VITALS — BODY MASS INDEX: 30.18 KG/M2 | WEIGHT: 164 LBS | HEIGHT: 62 IN

## 2025-09-02 DIAGNOSIS — Z12.31 ENCOUNTER FOR SCREENING MAMMOGRAM FOR MALIGNANT NEOPLASM OF BREAST: ICD-10-CM

## 2025-09-02 DIAGNOSIS — Z78.0 ASYMPTOMATIC POSTMENOPAUSAL STATE: ICD-10-CM

## 2025-09-02 PROCEDURE — 77067 SCR MAMMO BI INCL CAD: CPT | Performed by: RADIOLOGY

## 2025-09-02 PROCEDURE — 77063 BREAST TOMOSYNTHESIS BI: CPT | Performed by: RADIOLOGY

## 2025-09-02 PROCEDURE — 77063 BREAST TOMOSYNTHESIS BI: CPT

## 2026-02-09 ENCOUNTER — APPOINTMENT (OUTPATIENT)
Dept: DERMATOLOGY | Facility: CLINIC | Age: 71
End: 2026-02-09
Payer: MEDICARE

## 2026-08-17 ENCOUNTER — APPOINTMENT (OUTPATIENT)
Dept: CARDIOLOGY | Facility: CLINIC | Age: 71
End: 2026-08-17
Payer: MEDICARE